# Patient Record
Sex: FEMALE | Race: WHITE | NOT HISPANIC OR LATINO | Employment: FULL TIME | URBAN - NONMETROPOLITAN AREA
[De-identification: names, ages, dates, MRNs, and addresses within clinical notes are randomized per-mention and may not be internally consistent; named-entity substitution may affect disease eponyms.]

---

## 2018-12-05 ENCOUNTER — TRANSCRIBE ORDERS (OUTPATIENT)
Dept: ADMINISTRATIVE | Facility: HOSPITAL | Age: 58
End: 2018-12-05

## 2018-12-05 DIAGNOSIS — Z12.39 SCREENING BREAST EXAMINATION: Primary | ICD-10-CM

## 2018-12-17 ENCOUNTER — HOSPITAL ENCOUNTER (OUTPATIENT)
Dept: MAMMOGRAPHY | Facility: HOSPITAL | Age: 58
Discharge: HOME | End: 2018-12-17
Attending: FAMILY MEDICINE | Admitting: FAMILY MEDICINE

## 2018-12-17 DIAGNOSIS — Z12.39 SCREENING BREAST EXAMINATION: ICD-10-CM

## 2018-12-17 PROCEDURE — 77067 SCR MAMMO BI INCL CAD: CPT

## 2018-12-17 PROCEDURE — 77063 BREAST TOMOSYNTHESIS BI: CPT

## 2019-09-11 ENCOUNTER — TRANSCRIBE ORDERS (OUTPATIENT)
Dept: ADMINISTRATIVE | Facility: HOSPITAL | Age: 59
End: 2019-09-11

## 2019-09-11 DIAGNOSIS — Z12.39 BREAST SCREENING: Primary | ICD-10-CM

## 2019-12-18 ENCOUNTER — HOSPITAL ENCOUNTER (OUTPATIENT)
Dept: MAMMOGRAPHY | Facility: HOSPITAL | Age: 59
Discharge: HOME | End: 2019-12-18
Admitting: FAMILY MEDICINE

## 2019-12-18 DIAGNOSIS — Z12.39 BREAST SCREENING: ICD-10-CM

## 2019-12-18 PROCEDURE — 77063 BREAST TOMOSYNTHESIS BI: CPT

## 2019-12-18 PROCEDURE — 77067 SCR MAMMO BI INCL CAD: CPT

## 2020-11-30 ENCOUNTER — LAB (OUTPATIENT)
Dept: LAB | Facility: HOSPITAL | Age: 60
End: 2020-11-30

## 2020-11-30 ENCOUNTER — HOSPITAL ENCOUNTER (OUTPATIENT)
Dept: ULTRASOUND IMAGING | Facility: HOSPITAL | Age: 60
Discharge: HOME | End: 2020-11-30

## 2020-11-30 ENCOUNTER — HOSPITAL ENCOUNTER (OUTPATIENT)
Dept: GENERAL RADIOLOGY | Facility: HOSPITAL | Age: 60
Discharge: HOME | End: 2020-11-30

## 2020-11-30 ENCOUNTER — TRANSCRIBE ORDERS (OUTPATIENT)
Dept: ADMINISTRATIVE | Facility: HOSPITAL | Age: 60
End: 2020-11-30

## 2020-11-30 DIAGNOSIS — R53.83 OTHER FATIGUE: ICD-10-CM

## 2020-11-30 DIAGNOSIS — R22.1 LOCALIZED SWELLING, MASS AND LUMP, NECK: ICD-10-CM

## 2020-11-30 DIAGNOSIS — R22.1 LOCALIZED SWELLING, MASS AND LUMP, NECK: Primary | ICD-10-CM

## 2020-11-30 LAB
ALBUMIN SERPL-MCNC: 5.1 G/DL (ref 3.5–5)
ALBUMIN/GLOB SERPL: 1.4 G/DL (ref 1.1–2.5)
ALP SERPL-CCNC: 67 U/L (ref 24–120)
ALT SERPL W P-5'-P-CCNC: 76 U/L (ref 0–35)
ANION GAP SERPL CALCULATED.3IONS-SCNC: 12 MMOL/L (ref 4–13)
AST SERPL-CCNC: 46 U/L (ref 7–45)
AUTO MIXED CELLS #: 0.7 10*3/MM3 (ref 0.1–2.6)
AUTO MIXED CELLS %: 10.7 % (ref 0.1–24)
BILIRUB SERPL-MCNC: 0.5 MG/DL (ref 0.1–1)
BILIRUB UR QL STRIP: NEGATIVE
BUN SERPL-MCNC: 15 MG/DL (ref 5–21)
BUN/CREAT SERPL: 18.5
CALCIUM SPEC-SCNC: 10.7 MG/DL (ref 8.4–10.4)
CHLORIDE SERPL-SCNC: 103 MMOL/L (ref 98–110)
CLARITY UR: CLEAR
CO2 SERPL-SCNC: 30 MMOL/L (ref 24–31)
COLOR UR: YELLOW
CREAT SERPL-MCNC: 0.81 MG/DL (ref 0.5–1.4)
ERYTHROCYTE [DISTWIDTH] IN BLOOD BY AUTOMATED COUNT: 12.3 % (ref 12.3–15.4)
GFR SERPL CREATININE-BSD FRML MDRD: 73 ML/MIN/1.73
GLOBULIN UR ELPH-MCNC: 3.6 GM/DL
GLUCOSE SERPL-MCNC: 101 MG/DL (ref 70–100)
GLUCOSE UR STRIP-MCNC: NEGATIVE MG/DL
HCT VFR BLD AUTO: 43.7 % (ref 34–46.6)
HGB BLD-MCNC: 14.8 G/DL (ref 12–15.9)
HGB UR QL STRIP.AUTO: NEGATIVE
KETONES UR QL STRIP: NEGATIVE
LEUKOCYTE ESTERASE UR QL STRIP.AUTO: NEGATIVE
LYMPHOCYTES # BLD AUTO: 2.4 10*3/MM3 (ref 0.7–3.1)
LYMPHOCYTES NFR BLD AUTO: 37 % (ref 19.6–45.3)
MCH RBC QN AUTO: 31.8 PG (ref 26.6–33)
MCHC RBC AUTO-ENTMCNC: 33.9 G/DL (ref 31.5–35.7)
MCV RBC AUTO: 93.8 FL (ref 79–97)
NEUTROPHILS NFR BLD AUTO: 3.5 10*3/MM3 (ref 1.7–7)
NEUTROPHILS NFR BLD AUTO: 52.3 % (ref 42.7–76)
NITRITE UR QL STRIP: NEGATIVE
PH UR STRIP.AUTO: 5.5 [PH] (ref 5–8)
PLATELET # BLD AUTO: 240 10*3/MM3 (ref 140–450)
PMV BLD AUTO: 8.9 FL (ref 6–12)
POTASSIUM SERPL-SCNC: 3.9 MMOL/L (ref 3.5–5.3)
PROT SERPL-MCNC: 8.7 G/DL (ref 6.3–8.7)
PROT UR QL STRIP: NEGATIVE
RBC # BLD AUTO: 4.66 10*6/MM3 (ref 3.77–5.28)
SODIUM SERPL-SCNC: 145 MMOL/L (ref 135–145)
SP GR UR STRIP: 1.02 (ref 1–1.03)
UROBILINOGEN UR QL STRIP: NORMAL
WBC # BLD AUTO: 6.6 10*3/MM3 (ref 3.4–10.8)

## 2020-11-30 PROCEDURE — 80053 COMPREHEN METABOLIC PANEL: CPT | Performed by: NURSE PRACTITIONER

## 2020-11-30 PROCEDURE — 76536 US EXAM OF HEAD AND NECK: CPT

## 2020-11-30 PROCEDURE — 81003 URINALYSIS AUTO W/O SCOPE: CPT | Performed by: NURSE PRACTITIONER

## 2020-11-30 PROCEDURE — 36415 COLL VENOUS BLD VENIPUNCTURE: CPT | Performed by: NURSE PRACTITIONER

## 2020-11-30 PROCEDURE — 84443 ASSAY THYROID STIM HORMONE: CPT

## 2020-11-30 PROCEDURE — 71046 X-RAY EXAM CHEST 2 VIEWS: CPT

## 2020-11-30 PROCEDURE — 85025 COMPLETE CBC W/AUTO DIFF WBC: CPT

## 2020-11-30 PROCEDURE — 82306 VITAMIN D 25 HYDROXY: CPT

## 2020-12-01 LAB
25(OH)D3 SERPL-MCNC: 29.1 NG/ML (ref 30–100)
TSH SERPL DL<=0.05 MIU/L-ACNC: 1.5 UIU/ML (ref 0.27–4.2)

## 2021-01-26 ENCOUNTER — OFFICE VISIT (OUTPATIENT)
Dept: PRIMARY CARE CLINIC | Age: 61
End: 2021-01-26
Payer: COMMERCIAL

## 2021-01-26 VITALS
SYSTOLIC BLOOD PRESSURE: 116 MMHG | TEMPERATURE: 96.5 F | DIASTOLIC BLOOD PRESSURE: 72 MMHG | OXYGEN SATURATION: 99 % | HEIGHT: 68 IN | HEART RATE: 92 BPM | WEIGHT: 157 LBS | BODY MASS INDEX: 23.79 KG/M2

## 2021-01-26 DIAGNOSIS — R00.0 RAPID HEART BEAT: Primary | ICD-10-CM

## 2021-01-26 DIAGNOSIS — R00.2 PALPITATIONS: ICD-10-CM

## 2021-01-26 PROCEDURE — 99204 OFFICE O/P NEW MOD 45 MIN: CPT | Performed by: PEDIATRICS

## 2021-01-26 PROCEDURE — 93000 ELECTROCARDIOGRAM COMPLETE: CPT | Performed by: PEDIATRICS

## 2021-01-26 RX ORDER — LEVOCETIRIZINE DIHYDROCHLORIDE 5 MG/1
1 TABLET, FILM COATED ORAL PRN
COMMUNITY
Start: 2021-01-20 | End: 2022-09-08 | Stop reason: SDUPTHER

## 2021-01-26 SDOH — ECONOMIC STABILITY: TRANSPORTATION INSECURITY
IN THE PAST 12 MONTHS, HAS THE LACK OF TRANSPORTATION KEPT YOU FROM MEDICAL APPOINTMENTS OR FROM GETTING MEDICATIONS?: NO

## 2021-01-26 ASSESSMENT — ENCOUNTER SYMPTOMS
EYE DISCHARGE: 0
NAUSEA: 0
TROUBLE SWALLOWING: 0
COUGH: 0
ALLERGIC/IMMUNOLOGIC NEGATIVE: 1
CONSTIPATION: 0
WHEEZING: 0
VOICE CHANGE: 0
DIARRHEA: 0
SINUS PRESSURE: 0
EYES NEGATIVE: 1
EYE REDNESS: 0
EYE PAIN: 0
PHOTOPHOBIA: 0
BLOOD IN STOOL: 0
ABDOMINAL PAIN: 0
EYE ITCHING: 0
RHINORRHEA: 0
SHORTNESS OF BREATH: 0
SORE THROAT: 0
ABDOMINAL DISTENTION: 0
VOMITING: 0

## 2021-01-26 ASSESSMENT — PATIENT HEALTH QUESTIONNAIRE - PHQ9
SUM OF ALL RESPONSES TO PHQ QUESTIONS 1-9: 2
SUM OF ALL RESPONSES TO PHQ QUESTIONS 1-9: 2
2. FEELING DOWN, DEPRESSED OR HOPELESS: 1
1. LITTLE INTEREST OR PLEASURE IN DOING THINGS: 1

## 2021-01-26 NOTE — PROGRESS NOTES
[address]19 [facility], [address]  Phone [phone]   Fax [phone]      OFFICE VISIT: 2021    Kourtney German-: 1960      HPI  Reason For Visit:  Red lAvarez is a 64 y.o. [facility], Tachycardia (heart racing since around [address] and goes. resting HR is around 105-110, When she climbs stairs or does activity shoots to 120s. This past week is has been stable in the 90s. ), and Health Maintenance (never had a colonoscopy, flu shot at [facility])    Patient presents to [facility]. She is concerned about her racing heart. She notes that this has been occurring relatively frequently over the past 1-2 months. Resting heart rate has been around 100-110. When she does any type of activity this increases significantly even into the 120s. This mostly occurs at rest.    Historical review did not show any presence of sympathomimetics or external cause of elevated heart rate. She has not had any lightheadedness dizziness or presyncope symptoms at all. When her heart rate does go fast, she notes that the rhythm remains regular. height is 5' 8\" (1.727 m) and weight is 157 lb (71.2 kg). Her temporal temperature is 96.5 °F (35.8 °C). Her blood pressure is 116/72 and her pulse is 92. Her oxygen saturation is 99%. Body mass index is 23.87 kg/m². I have reviewed the following with the Ms. German   Lab Review  No visits with results within 6 Month(s) from this visit. Latest known visit with results is:   No results found for any previous visit. Copies of these are in the chart. Current Outpatient Medications   Medication Sig Dispense Refill    levocetirizine (XYZAL) 5 MG tablet Take 1 tablet by mouth nightly       No current facility-administered medications for this visit. Allergies: Patient has no known allergies. History reviewed. No pertinent past medical history.     Family History   Problem Relation Age of Onset    Diabetes Mother     Diabetes Father     Heart Disease Father        Past Surgical History:   Procedure Laterality Date    CHOLECYSTECTOMY      HYSTERECTOMY, VAGINAL      TONSILLECTOMY         Social History     Tobacco Use    Smoking status: Never Smoker    Smokeless tobacco: Never Used   Substance Use Topics    Alcohol use: Not Currently        Review of Systems   Constitutional: Negative for appetite change (appetite normal), chills, fever and unexpected weight change. Weight is stable   HENT: Negative for congestion, dental problem, ear pain, hearing loss, postnasal drip, rhinorrhea, sinus pressure, sore throat, tinnitus, trouble swallowing and voice change. No headache, lightheadedness or dizziness   Eyes: Negative. Negative for photophobia, pain, discharge, redness, itching and visual disturbance. No change in vision. No blurred vision. No double vision   Respiratory: Negative for cough, shortness of breath (at rest or on exertion that is new or changes) and wheezing. No history of asthma or pneumonia. No orthopnea. No pain with deep excursions   Cardiovascular: Positive for palpitations (rapid HR with occasional hard beat. ). Negative for chest pain (or pressure) and leg swelling. Gastrointestinal: Negative for abdominal distention, abdominal pain, blood in stool (or melena), constipation, diarrhea, nausea and vomiting. No acid reflux   Endocrine: Negative. Negative for cold intolerance, heat intolerance, polydipsia and polyuria. Genitourinary: Negative. Negative for pelvic pain and urgency. No problems urinating. No problems with incontinence. Musculoskeletal: Negative for neck pain (no change in range of motion). Skin: Negative. Negative for rash. Allergic/Immunologic: Negative. Neurological: Negative for dizziness, tremors, syncope, weakness, light-headedness, numbness (or tingling sensation) and headaches.    Hematological: Negative for adenopathy. Does not bruise/bleed easily. Psychiatric/Behavioral: Negative for confusion (or difficulty with memory), dysphoric mood (to the point of interfering with her life functions) and sleep disturbance. The patient is not nervous/anxious (that is out of ordinary). Physical Exam  Vitals signs reviewed. Constitutional:       General: She is not in acute distress. Appearance: She is well-developed and normal weight. She is not toxic-appearing. Comments: Body habitus is    HENT:      Head: Normocephalic and atraumatic. Right Ear: Hearing, tympanic membrane, ear canal and external ear normal.      Left Ear: Hearing, tympanic membrane, ear canal and external ear normal.      Nose: Nose normal.      Mouth/Throat:      Mouth: Mucous membranes are moist.      Pharynx: Oropharynx is clear. Eyes:      General: Lids are normal.      Extraocular Movements: Extraocular movements intact. Conjunctiva/sclera: Conjunctivae normal.      Pupils: Pupils are equal, round, and reactive to light. Neck:      Musculoskeletal: Neck supple. Thyroid: No thyromegaly. Vascular: No carotid bruit or JVD. Trachea: Phonation normal.   Cardiovascular:      Rate and Rhythm: Normal rate and regular rhythm. No extrasystoles are present. Chest Wall: PMI is not displaced. Pulses: Normal pulses. Heart sounds: Normal heart sounds. No murmur. No friction rub. No gallop. Pulmonary:      Effort: Pulmonary effort is normal. No respiratory distress. Breath sounds: Normal breath sounds. No wheezing, rhonchi or rales. Abdominal:      General: Bowel sounds are normal. There is no distension. Palpations: Abdomen is soft. There is no mass. Tenderness: There is no abdominal tenderness. Genitourinary:     Comments: Examination deferred  Musculoskeletal: Normal range of motion. Comments: Joint examination reveals no acute arthritis or synovitis.      Lymphadenopathy: Cervical: No cervical adenopathy. Skin:     General: Skin is warm and dry. Capillary Refill: Capillary refill takes less than 2 seconds. Findings: No rash. Neurological:      General: No focal deficit present. Mental Status: She is alert and oriented to person, place, and time. Cranial Nerves: No cranial nerve deficit (by gross examination). Sensory: No sensory deficit. Motor: No tremor or atrophy. Gait: Gait normal.      Comments: Nio focal deficits appreciated   Psychiatric:         Mood and Affect: Mood normal.         Speech: Speech normal.         Behavior: Behavior normal. Behavior is cooperative. ECG interpretation:    ECG shows a sinus rhythm at 68 bpm.  Intervals and axes are normal.  There is a RR prime in V1 with a QRS is 80 ms. This is nondiagnostic. There are no ECG changes suggestive of ischemia. No evidence of prior myocardial infarction. Summary: Normal ECG       ASSESSMENT      ICD-10-CM    1. Rapid heart beat  R00.0 CBC Auto Differential     Comprehensive Metabolic Panel     Lipid Panel     Microalbumin / Creatinine Urine Ratio     T4, Free     TSH without Reflex     EKG 12 Lead     EKG 12 Lead     Longterm Continuous Cardiac Event Monitor   2. Palpitations  R00.2 CBC Auto Differential     Comprehensive Metabolic Panel     Lipid Panel     Microalbumin / Creatinine Urine Ratio     T4, Free     TSH without Reflex     Longterm Continuous Cardiac Event Monitor         PLAN    1. Rapid heart beat  Work-up for tachycardia and arrhythmia.  - CBC Auto Differential; Future  - Comprehensive Metabolic Panel; Future  - Lipid Panel; Future  - Microalbumin / Creatinine Urine Ratio; Future  - T4, Free; Future  - TSH without Reflex; Future  - EKG 12 Lead; Future  - EKG 12 Lead  - Longterm Continuous Cardiac Event Monitor; Future    2.  Palpitations  Work-up for tachycardia and arrhythmia  - CBC Auto Differential; Future  - Comprehensive Metabolic Panel; Future  - Lipid Panel; Future  - Microalbumin / Creatinine Urine Ratio; Future  - T4, Free; Future  - TSH without Reflex; Future  - Longterm Continuous Cardiac Event Monitor; Future      Orders Placed This Encounter   Procedures    CBC Auto Differential    Comprehensive Metabolic Panel    Lipid Panel    Microalbumin / Creatinine Urine Ratio    T4, Free    TSH without Reflex    Longterm Continuous Cardiac Event Monitor    EKG 12 Lead      Plan of action is to check labs for metabolic sign of elevated heart rate. This would include electrolyte abnormalities, acid-base irregularities, anemia, hormonal abnormalities such as thyroid. There is not appear to be any sympathomimetics involved. We will also look for any pathologic rhythms with the Zio patch. If laboratory studies are normal and there are no pathologic rhythms, this most likely is a benign process. We will then have the option of treating with a beta-blocker depending on how much this condition bothers her. If it is not overly bothersome, we do not have to treat with anything assuming that all of the above are normal.  This plan of action was described to, and understood by the patient in the office today    Return in about 2 months (around 3/26/2021) for 30. If all the above findings are normal, and she chooses not to start a beta-blocker, she may decide to cancel the appointment in 2 months.   Any action to do so will be considered that she has chosen not to start a beta-blocker and is comfortable with simply knowing that this is nonpathologic process    This was an in-house visit

## 2021-01-29 ENCOUNTER — HOSPITAL ENCOUNTER (OUTPATIENT)
Dept: NON INVASIVE DIAGNOSTICS | Age: 61
Discharge: HOME | End: 2021-01-29
Payer: COMMERCIAL

## 2021-01-29 DIAGNOSIS — R00.0 RAPID HEART BEAT: ICD-10-CM

## 2021-01-29 DIAGNOSIS — R00.2 PALPITATIONS: ICD-10-CM

## 2021-01-29 PROCEDURE — 93246 EXT ECG>7D<15D RECORDING: CPT

## 2021-02-05 DIAGNOSIS — R00.2 PALPITATIONS: ICD-10-CM

## 2021-02-05 DIAGNOSIS — R00.0 RAPID HEART BEAT: ICD-10-CM

## 2021-02-05 LAB
ALBUMIN SERPL-MCNC: 4.5 G/DL (ref 3.5–5.2)
ALP BLD-CCNC: 72 U/L (ref 35–104)
ALT SERPL-CCNC: 36 U/L (ref 5–33)
ANION GAP SERPL CALCULATED.3IONS-SCNC: 15 MMOL/L (ref 7–19)
AST SERPL-CCNC: 23 U/L (ref 5–32)
BASOPHILS ABSOLUTE: 0 K/UL (ref 0–0.2)
BASOPHILS RELATIVE PERCENT: 0.4 % (ref 0–1)
BILIRUB SERPL-MCNC: 0.3 MG/DL (ref 0.2–1.2)
BUN BLDV-MCNC: 15 MG/DL (ref 6–20)
CALCIUM SERPL-MCNC: 9.9 MG/DL (ref 8.6–10)
CHLORIDE BLD-SCNC: 109 MMOL/L (ref 98–111)
CHOLESTEROL, TOTAL: 213 MG/DL (ref 160–199)
CO2: 20 MMOL/L (ref 22–29)
CREAT SERPL-MCNC: 0.8 MG/DL (ref 0.5–0.9)
CREATININE URINE: 278.2 MG/DL (ref 4.2–622)
EOSINOPHILS ABSOLUTE: 0.1 K/UL (ref 0–0.6)
EOSINOPHILS RELATIVE PERCENT: 2.6 % (ref 0–5)
GFR AFRICAN AMERICAN: >59
GFR NON-AFRICAN AMERICAN: >60
GLUCOSE BLD-MCNC: 101 MG/DL (ref 74–109)
HCT VFR BLD CALC: 41.7 % (ref 37–47)
HDLC SERPL-MCNC: 74 MG/DL (ref 65–121)
HEMOGLOBIN: 13.2 G/DL (ref 12–16)
IMMATURE GRANULOCYTES #: 0 K/UL
LDL CHOLESTEROL CALCULATED: 124 MG/DL
LYMPHOCYTES ABSOLUTE: 1.7 K/UL (ref 1.1–4.5)
LYMPHOCYTES RELATIVE PERCENT: 37.3 % (ref 20–40)
MCH RBC QN AUTO: 31.8 PG (ref 27–31)
MCHC RBC AUTO-ENTMCNC: 31.7 G/DL (ref 33–37)
MCV RBC AUTO: 100.5 FL (ref 81–99)
MICROALBUMIN UR-MCNC: 1.3 MG/DL (ref 0–19)
MICROALBUMIN/CREAT UR-RTO: 4.7 MG/G
MONOCYTES ABSOLUTE: 0.5 K/UL (ref 0–0.9)
MONOCYTES RELATIVE PERCENT: 10.3 % (ref 0–10)
NEUTROPHILS ABSOLUTE: 2.3 K/UL (ref 1.5–7.5)
NEUTROPHILS RELATIVE PERCENT: 49.4 % (ref 50–65)
PDW BLD-RTO: 13 % (ref 11.5–14.5)
PLATELET # BLD: 251 K/UL (ref 130–400)
PMV BLD AUTO: 10.3 FL (ref 9.4–12.3)
POTASSIUM SERPL-SCNC: 4.4 MMOL/L (ref 3.5–5)
RBC # BLD: 4.15 M/UL (ref 4.2–5.4)
SODIUM BLD-SCNC: 144 MMOL/L (ref 136–145)
T4 FREE: 1.31 NG/DL (ref 0.93–1.7)
TOTAL PROTEIN: 6.9 G/DL (ref 6.6–8.7)
TRIGL SERPL-MCNC: 77 MG/DL (ref 0–149)
TSH SERPL DL<=0.05 MIU/L-ACNC: 2.07 UIU/ML (ref 0.27–4.2)
WBC # BLD: 4.7 K/UL (ref 4.8–10.8)

## 2021-03-26 ENCOUNTER — OFFICE VISIT (OUTPATIENT)
Dept: PRIMARY CARE CLINIC | Age: 61
End: 2021-03-26
Payer: COMMERCIAL

## 2021-03-26 VITALS
BODY MASS INDEX: 23.64 KG/M2 | SYSTOLIC BLOOD PRESSURE: 118 MMHG | DIASTOLIC BLOOD PRESSURE: 82 MMHG | OXYGEN SATURATION: 98 % | HEIGHT: 68 IN | WEIGHT: 156 LBS | HEART RATE: 84 BPM | TEMPERATURE: 97.3 F

## 2021-03-26 DIAGNOSIS — R00.2 PALPITATIONS: Primary | ICD-10-CM

## 2021-03-26 PROCEDURE — 99213 OFFICE O/P EST LOW 20 MIN: CPT | Performed by: PEDIATRICS

## 2021-03-26 ASSESSMENT — ENCOUNTER SYMPTOMS
SINUS PRESSURE: 0
COUGH: 0
NAUSEA: 0
VOICE CHANGE: 0
ABDOMINAL PAIN: 0
EYES NEGATIVE: 1
EYE PAIN: 0
RHINORRHEA: 0
SORE THROAT: 0
BLOOD IN STOOL: 0
CONSTIPATION: 0
TROUBLE SWALLOWING: 0
EYE ITCHING: 0
DIARRHEA: 0
PHOTOPHOBIA: 0
SHORTNESS OF BREATH: 0
EYE DISCHARGE: 0
WHEEZING: 0
ABDOMINAL DISTENTION: 0
VOMITING: 0
ALLERGIC/IMMUNOLOGIC NEGATIVE: 1
EYE REDNESS: 0

## 2021-03-26 NOTE — PROGRESS NOTES
1719 [facility], [address]  Phone [phone]   Fax [phone]      OFFICE VISIT: 3/26/2021    Kourtney German-: 1960      HPI  Reason For Visit:  Bebe Lund is a 63 y.o. Follow-up (since taking Zio off she has had maybe 2 spells that lasted a few moments, would like to discuss zio report)    Patient presents on follow-up from initial evaluation on 2021. She was having episodes of palpitations and rapid heartbeat. These were associated with activity at times. A Zio patch extended rhythm analysis was performed and did reveal episodes of supraventricular tachycardia that correspond with her symptoms. Laboratory evaluation was also performed which was negative (no identifiable etiology of rhythm aberration) as noted below    Her only medication is Xyzal 5 mg daily. Blood pressure is near optimal today. Heart rate is 84 today. Heart rate was 72 on last evaluation. Minimum heart rate on ZIO recording was 54 and maximum of 176. Predominant rhythm was sinus. No pathologic rhythms were noted on this evaluation. Mrs. Gael Montaño has not received any feedback in regards to this study as of yet. The symptoms that she experiences are not significant to the point of interfering with her life or lifestyle. We did discuss the symptoms and the rhythm abnormalities from a risk benefit ratio standpoint. She does not feel at this point in time that the risks are significant enough to merit a medication for her. Again, this is a benign rhythm. height is 5' 8\" (1.727 m) and weight is 156 lb (70.8 kg). Her temporal temperature is 97.3 °F (36.3 °C). Her blood pressure is 118/82 and her pulse is 84. Her oxygen saturation is 98%. Body mass index is 23.72 kg/m². I have reviewed the following with the Ms. German   Lab Review  Orders Only on 2021   Component Date Value    WBC 2021 4.7*    RBC 2021 4.15*    Hemoglobin 2021 13.2     Hematocrit 02/05/2021 41.7     MCV 02/05/2021 100.5*    MCH 02/05/2021 31.8*    MCHC 02/05/2021 31.7*    RDW 02/05/2021 13.0     Platelets 20/91/2313 251     MPV 02/05/2021 10.3     Neutrophils % 02/05/2021 49.4*    Lymphocytes % 02/05/2021 37.3     Monocytes % 02/05/2021 10.3*    Eosinophils % 02/05/2021 2.6     Basophils % 02/05/2021 0.4     Neutrophils Absolute 02/05/2021 2.3     Immature Granulocytes # 02/05/2021 0.0     Lymphocytes Absolute 02/05/2021 1.7     Monocytes Absolute 02/05/2021 0.50     Eosinophils Absolute 02/05/2021 0.10     Basophils Absolute 02/05/2021 0.00     Sodium 02/05/2021 144     Potassium 02/05/2021 4.4     Chloride 02/05/2021 109     CO2 02/05/2021 20*    Anion Gap 02/05/2021 15     Glucose 02/05/2021 101     BUN 02/05/2021 15     CREATININE 02/05/2021 0.8     GFR Non- 02/05/2021 >60     GFR  02/05/2021 >59     Calcium 02/05/2021 9.9     Total Protein 02/05/2021 6.9     Albumin 02/05/2021 4.5     Total Bilirubin 02/05/2021 0.3     Alkaline Phosphatase 02/05/2021 72     ALT 02/05/2021 36*    AST 02/05/2021 23     Cholesterol, Total 02/05/2021 213*    Triglycerides 02/05/2021 77     HDL 02/05/2021 74     LDL Calculated 02/05/2021 124     Microalbumin, Random Uri* 02/05/2021 1.30     Creatinine, Ur 02/05/2021 278.2     Microalbumin Creatinine * 02/05/2021 4.7     T4 Free 02/05/2021 1.31     TSH 02/05/2021 2.070      Copies of these are in the chart. Current Outpatient Medications   Medication Sig Dispense Refill    levocetirizine (XYZAL) 5 MG tablet Take 1 tablet by mouth nightly       No current facility-administered medications for this visit. Allergies: Patient has no known allergies. No past medical history on file.     Family History   Problem Relation Age of Onset    Diabetes Mother     Diabetes Father     Heart Disease Father        Past Surgical History:   Procedure Laterality Date    dysphoric mood (to the point of interfering with her life functions) and sleep disturbance. The patient is not nervous/anxious (that is out of ordinary). Physical Exam  Constitutional:       Appearance: She is normal weight. HENT:      Head: Normocephalic and atraumatic. Right Ear: External ear normal.      Left Ear: External ear normal.      Nose: Nose normal.      Mouth/Throat:      Mouth: Mucous membranes are moist.      Pharynx: Oropharynx is clear. Eyes:      Extraocular Movements: Extraocular movements intact. Pupils: Pupils are equal, round, and reactive to light. Neck:      Musculoskeletal: Normal range of motion. Skin:     General: Skin is warm and dry. Neurological:      General: No focal deficit present. Mental Status: She is alert. Psychiatric:         Mood and Affect: Mood normal.         Behavior: Behavior normal.             ASSESSMENT      ICD-10-CM    1. Palpitations  R00.2          PLAN    1. Palpitations  Reassurance that this is not a pathologic rhythm. The significance of this is not enough for her to want to take a medication on an ongoing basis. She will continue lifestyle modification by improving her physical endurance. She is also addressing stress issues. She is avoiding sympathomimetic medications and foods. If things change, she will let me know and we can always start a medication at that time. Medication purpose would be for symptom relief. We also discussed potential pathologic rhythms as well as their symptoms. Should she have any of these, she will let me know immediately because that would mandate us to consider a medication or other intervention. No orders of the defined types were placed in this encounter. Return if symptoms worsen or fail to improve. This was an in-house visit.

## 2021-11-12 ENCOUNTER — NURSE ONLY (OUTPATIENT)
Dept: PRIMARY CARE CLINIC | Age: 61
End: 2021-11-12
Payer: COMMERCIAL

## 2021-11-12 DIAGNOSIS — Z23 NEED FOR COVID-19 VACCINE: Primary | ICD-10-CM

## 2021-11-12 PROCEDURE — 91300 COVID-19, PFIZER VACCINE 30MCG/0.3ML DOSE: CPT | Performed by: PEDIATRICS

## 2021-11-12 PROCEDURE — 0004A COVID-19, PFIZER VACCINE 30MCG/0.3ML DOSE: CPT | Performed by: PEDIATRICS

## 2021-11-12 NOTE — PROGRESS NOTES
After obtaining consent, and per orders of Dr. Susana Bills, injection of Pfizer Covid Booster 0.3 mL was given in the Left deltoid . Patient tolerated it well. Patient instructed to report any adverse reaction to me immediately. COVID-19, Pfizer, PF, 30mcg/0.3mL      Current Status      Updated on: 11/12/2021  9:07 AM    Name Date Status Dose VIS Date Route Site  Lot# Given By Verified By   COVID-19Piero PF, 30mcg/0.3mL 11/12/2021 Given 0.3 mL 8/23/2021 IM left delt [employer] [id number] Lenin Malcolm --   Exp. Date [facility] # Product Time Location External Comment   11/30/2021 [id number] [employer] COVID-19 Vacc -- -- -- --   Question Answer Comment   VIS/EUA reviewed with patient and questions answered? Yes    VIS/EUA Given Date 11/12/2021    COVID-19 Vaccine Dose Booster    Pandemic Funds used for this vaccine?  Yes    Target Population Age 12+    Updated by: Lenin Malcolm

## 2021-11-17 ENCOUNTER — TRANSCRIBE ORDERS (OUTPATIENT)
Dept: ADMINISTRATIVE | Facility: HOSPITAL | Age: 61
End: 2021-11-17

## 2021-11-17 DIAGNOSIS — Z12.31 SCREENING MAMMOGRAM, ENCOUNTER FOR: Primary | ICD-10-CM

## 2022-01-05 ENCOUNTER — HOSPITAL ENCOUNTER (OUTPATIENT)
Dept: MAMMOGRAPHY | Facility: HOSPITAL | Age: 62
Discharge: HOME | End: 2022-01-05
Admitting: PEDIATRICS

## 2022-01-05 LAB — MAMMOGRAPHY, EXTERNAL: NORMAL

## 2022-01-05 PROCEDURE — 77067 SCR MAMMO BI INCL CAD: CPT

## 2022-01-05 PROCEDURE — 77063 BREAST TOMOSYNTHESIS BI: CPT

## 2022-06-24 ENCOUNTER — OFFICE VISIT (OUTPATIENT)
Dept: PRIMARY CARE CLINIC | Age: 62
End: 2022-06-24
Payer: COMMERCIAL

## 2022-06-24 VITALS
BODY MASS INDEX: 22.58 KG/M2 | HEART RATE: 98 BPM | WEIGHT: 149 LBS | TEMPERATURE: 98.4 F | HEIGHT: 68 IN | OXYGEN SATURATION: 100 % | DIASTOLIC BLOOD PRESSURE: 80 MMHG | SYSTOLIC BLOOD PRESSURE: 126 MMHG

## 2022-06-24 DIAGNOSIS — K64.8 INTERNAL HEMORRHOIDS: ICD-10-CM

## 2022-06-24 DIAGNOSIS — Z12.11 COLON CANCER SCREENING: ICD-10-CM

## 2022-06-24 DIAGNOSIS — Z91.09 ENVIRONMENTAL ALLERGIES: Primary | ICD-10-CM

## 2022-06-24 PROCEDURE — 99214 OFFICE O/P EST MOD 30 MIN: CPT | Performed by: PEDIATRICS

## 2022-06-24 RX ORDER — LEVOCETIRIZINE DIHYDROCHLORIDE 5 MG/1
5 TABLET, FILM COATED ORAL NIGHTLY
Qty: 90 TABLET | Refills: 3 | Status: SHIPPED | OUTPATIENT
Start: 2022-06-24

## 2022-06-24 ASSESSMENT — ENCOUNTER SYMPTOMS
RHINORRHEA: 1
EYES NEGATIVE: 1
RESPIRATORY NEGATIVE: 1

## 2022-06-24 NOTE — PROGRESS NOTES
[address]19 [facility], [address]  Phone [phone]   Fax [phone]      OFFICE VISIT: 2022    Kourtney German-: 1960      [facility]  Reason For Visit:  Markie Musa is a 63 y.o. Hemorrhoids (BRBPR ), Health Maintenance (has never had a colonoscopy. needs one ordered. ), and Discuss Medications (wants a rx for xyzal)    Patient presents as of bleeding hemorrhoids. This is been going on for about 3 months. She has utilized preparation H and sitz baths. She believes these are internal hemorrhoids and     She also has difficulty with some allergies. She would like a prescription for Xyzal sent in.     height is 5' 8\" (1.727 m) and weight is 149 lb (67.6 kg). Her temporal temperature is 98.4 °F (36.9 °C). Her blood pressure is 126/80 and her pulse is 98. Her oxygen saturation is 100%. Body mass index is 22.66 kg/m². I have reviewed the following with the Ms. German   Lab Review  No visits with results within 6 Month(s) from this visit.    Latest known visit with results is:   Orders Only on 2021   Component Date Value    WBC 2021 4.7*    RBC 2021 4.15*    Hemoglobin 2021 13.2     Hematocrit 2021 41.7     MCV 2021 100.5*    MCH 2021 31.8*    MCHC 2021 31.7*    RDW 2021 13.0     Platelets  251     MPV 2021 10.3     Neutrophils % 2021 49.4*    Lymphocytes % 2021 37.3     Monocytes % 2021 10.3*    Eosinophils % 2021 2.6     Basophils % 2021 0.4     Neutrophils Absolute 2021 2.3     Immature Granulocytes # 2021 0.0     Lymphocytes Absolute 2021 1.7     Monocytes Absolute 2021 0.50     Eosinophils Absolute 2021 0.10     Basophils Absolute 2021 0.00     Sodium 2021 144     Potassium 2021 4.4     Chloride 2021 109     CO2 2021 20*    Anion Gap 2021 15     Glucose 2021 101     BUN 02/05/2021 15     CREATININE 02/05/2021 0.8     GFR Non- 02/05/2021 >60     GFR  02/05/2021 >59     Calcium 02/05/2021 9.9     Total Protein 02/05/2021 6.9     Albumin 02/05/2021 4.5     Total Bilirubin 02/05/2021 0.3     Alkaline Phosphatase 02/05/2021 72     ALT 02/05/2021 36*    AST 02/05/2021 23     Cholesterol, Total 02/05/2021 213*    Triglycerides 02/05/2021 77     HDL 02/05/2021 74     LDL Calculated 02/05/2021 124     Microalbumin, Random Uri* 02/05/2021 1.30     Creatinine, Ur 02/05/2021 278.2     Microalbumin Creatinine * 02/05/2021 4.7     T4 Free 02/05/2021 1.31     TSH 02/05/2021 2.070      Copies of these are in the chart. Current Outpatient Medications   Medication Sig Dispense Refill    levocetirizine (XYZAL) 5 MG tablet Take 1 tablet by mouth nightly 90 tablet 3    levocetirizine (XYZAL) 5 MG tablet Take 1 tablet by mouth nightly       No current facility-administered medications for this visit. Allergies: Patient has no known allergies. No past medical history on file. Family History   Problem Relation Age of Onset    Diabetes Mother     Diabetes Father     Heart Disease Father        Past Surgical History:   Procedure Laterality Date    CHOLECYSTECTOMY      HYSTERECTOMY, VAGINAL      TONSILLECTOMY         Social History     Tobacco Use    Smoking status: Never Smoker    Smokeless tobacco: Never Used   Substance Use Topics    Alcohol use: Not Currently        Review of Systems   Constitutional: Negative. HENT: Positive for congestion, postnasal drip and rhinorrhea. Eyes: Negative. Respiratory: Negative. Cardiovascular: Negative. Gastrointestinal:        Internal hemorrhoids  Due for colon cancer screening. Endocrine: Negative. Genitourinary: Negative. Musculoskeletal: Negative. Skin: Negative. Allergic/Immunologic: Positive for environmental allergies. Neurological: Negative. Hematological: Negative. Psychiatric/Behavioral: Negative. Physical Exam  Constitutional:       Appearance: Normal appearance. HENT:      Head: Normocephalic and atraumatic. Right Ear: Tympanic membrane, ear canal and external ear normal.      Left Ear: Tympanic membrane, ear canal and external ear normal.      Nose: Nose normal.      Mouth/Throat:      Mouth: Mucous membranes are moist.      Pharynx: Oropharynx is clear. Posterior oropharyngeal erythema (mild posterior) present. Eyes:      Extraocular Movements: Extraocular movements intact. Pupils: Pupils are equal, round, and reactive to light. Cardiovascular:      Rate and Rhythm: Normal rate and regular rhythm. Pulses: Normal pulses. Heart sounds: Normal heart sounds. No murmur heard. No friction rub. No gallop. Pulmonary:      Effort: Pulmonary effort is normal.      Breath sounds: Normal breath sounds. No stridor. No wheezing or rales. Abdominal:      General: Abdomen is flat. There is no distension. Palpations: Abdomen is soft. Tenderness: There is no abdominal tenderness. Genitourinary:     Comments: Exam deferred  Musculoskeletal:         General: Normal range of motion. Cervical back: Normal range of motion and neck supple. Right lower leg: No edema. Left lower leg: No edema. Skin:     General: Skin is warm and dry. Neurological:      General: No focal deficit present. Mental Status: She is alert and oriented to person, place, and time. Psychiatric:         Mood and Affect: Mood normal.         Behavior: Behavior normal.             ASSESSMENT      ICD-10-CM    1. Environmental allergies  Z91.09 levocetirizine (XYZAL) 5 MG tablet   2. Colon cancer screening  Z12.11 Christa Machaod MD, Gastroenterology, [place]   3. Internal hemorrhoids  K64.8 Christa Machado MD, Gastroenterology, [facility]    1.  Environmental allergies  I will send in prescription for Xyzal at this time  - levocetirizine (XYZAL) 5 MG tablet; Take 1 tablet by mouth nightly  Dispense: 90 tablet; Refill: 3    2. Colon cancer screening  We are going to set her up for colon cancer screening. With bright red blood per rectum she would not qualify for true screening and this may be diagnostic, given that we have a likely source for her bleeding. She does have a family member (not first-degree relative) with UC, and another with Crohn's. - Telma Narvaez MD, Gastroenterology, [address] [place] [address]    3. Internal hemorrhoids  We will set up with gastroenterology to see if they can band these  - Telma Narvaez MD, Gastroenterology, [place]      Orders Placed This Encounter   Procedures   Telma Narvaez MD, Gastroenterology, [address] [place] [address]        Return if symptoms worsen or fail to improve.        This was an in-house visit

## 2022-09-08 ENCOUNTER — OFFICE VISIT (OUTPATIENT)
Dept: GASTROENTEROLOGY | Age: 62
End: 2022-09-08
Payer: COMMERCIAL

## 2022-09-08 VITALS
WEIGHT: 155.8 LBS | OXYGEN SATURATION: 98 % | BODY MASS INDEX: 23.61 KG/M2 | HEART RATE: 68 BPM | DIASTOLIC BLOOD PRESSURE: 68 MMHG | SYSTOLIC BLOOD PRESSURE: 116 MMHG | HEIGHT: 68 IN

## 2022-09-08 DIAGNOSIS — K62.5 RECTAL BLEEDING: Primary | ICD-10-CM

## 2022-09-08 DIAGNOSIS — R10.84 GENERALIZED ABDOMINAL PAIN: ICD-10-CM

## 2022-09-08 DIAGNOSIS — R19.7 INTERMITTENT DIARRHEA: ICD-10-CM

## 2022-09-08 PROCEDURE — 99204 OFFICE O/P NEW MOD 45 MIN: CPT | Performed by: NURSE PRACTITIONER

## 2022-09-08 ASSESSMENT — ENCOUNTER SYMPTOMS
TROUBLE SWALLOWING: 0
DIARRHEA: 1
SHORTNESS OF BREATH: 0
NAUSEA: 0
COUGH: 0
VOMITING: 0
CONSTIPATION: 0
RECTAL PAIN: 0
BLOOD IN STOOL: 0
ABDOMINAL PAIN: 1
CHOKING: 0
ANAL BLEEDING: 1
ABDOMINAL DISTENTION: 0

## 2022-09-08 NOTE — PATIENT INSTRUCTIONS
Schedule colonoscopy. No aspirin, ibuprofen, naproxen, fish oil or vitamin E for 5 days before procedure. Do not eat or drink after midnight the day of the procedure. Allowed medications can be taken with a small sip of water. Please review your prep instructions for allowed medications. You will not be able to drive for 24 hours after the procedure due to sedation. You must have a responsible adult, 25 year or older, to accompany you and drive you home the day of your procedure. If you are on blood thinners, clearance from the prescribing physician will be obtained before your procedure is scheduled. If it is determined it is not safe to hold these medications for a short time an alternative procedure for evaluation may be recommended. Risks of colonoscopy include, but are not limited to, perforation, bleeding, and infection, Risk of perforation and bleeding are increased if there is a polyp removed. Anesthesia risks will be reviewed with you before the procedure by a member of the anesthesia department. Your physician may also schedule a follow up appointment with the nurse practitioner to discuss pathology, symptoms or to check if you have had any problems related to your procedure. If you prefer not to return to the office after your procedure please discuss this with your physician on the day of your colonoscopy. The physician will talk with you and/or your family after the procedure is completed. Final recommendations are based on the pathologist report if biopsies or specimens are taken. If polyps are removed during the procedure they will be sent to a pathologist for analysis. Unless you have a follow up appointment scheduled, you will be notified by mail of the pathology results within 4 weeks. If you have not received results after 4 weeks you may call the office to obtain this information. For Colonoscopy:   You will be given specific directions regarding restrictions to diet and bowel prep instructions including laxatives. Please read these instructions one week prior to your scheduled procedure to ensure that you are prepared. If you have any questions regarding these instructions please call our office Mon through Fri from 8:00 am to 4:00 pm.     Follow prep instructions provided for bowel prep. Take all of the bowel prep as directed. If you are having problems with nausea, stop your prep for 30-45 min to allow the nausea to subside before resuming your prep. It is important to drink plenty of fluids throughout the day before taking your laxatives. This will help to protect your kidneys, prevent dehydration and maximize the effect of the bowel prep. Your diet before a colonoscopy bowel preparation is very important to ensure a successful colon exam. It is recommended to consider certain changes to your diet three to four days prior to the procedure. Remember that your bowels need to be completely empty for the exam.    What foods are good to eat? Cut down on heavy solid foods three to four days before the procedure and start introducing lighter meals to your diet. The following food suggestions are a good part of your diet before a colonoscopy bowel preparation. Light meat that is easily digestible such as chicken (without the skin)   Potatoes without skin   Cheese   Eggs   A light meal of steamed white fish   Light clear soups    Foods and drinks to avoid  Avoid foods that contain too much fiber. Stay clear of dark colored beverages. They can stick to the walls of the digestive tract and make it difficult to differentiate from blood.  Some of these foods are:  Red meat, rice, nuts and vegetables   Milk, other milk based fluids and cream   Most fruit and puddings   Whole grain pasta   Cereals, bran and seeds   Colored beverages, especially those that are red or purple in color   Red colored Jell-O     On the day before the colonoscopy, continue to drink

## 2022-09-08 NOTE — PROGRESS NOTES
Subjective:     Patient ID: Micky Gallego is a 63 y.o. female  PCP: Cesar Del Rio DO  Referring Provider: Meryl Newsome DO    HPI  Patient presents to the office today with the following complaints: New Patient      Pt seen today for colonoscopy. She admits to BRBPR. She c/o internal hemorrhoids. She reports diarrhea stools 2-3 times a week. She will have bleeding with this. She will have to wear panty liners. Blood in bright red in color. \"I am not convinced bleeding is from the outside. \"  Denies any rectal pain or burning. She will have abdominal pain on days she has diarrhea. She denies any nocturnal diarrhea, usually a morning thing. She has never had EGD or Colonoscopy   Denies any family hx colon cancer or colon polyps    Assessment:     1. Rectal bleeding    2. Intermittent diarrhea    3. Generalized abdominal pain            Plan:   - Schedule colonoscopy  Instruct on bowel prep. Nothing to eat or drink after midnight the day of the exam.  Unable to drive for 24 hours after the procedure. No aspirin or nonsteroidal anti-inflammatories for 5 days before procedure. I have discussed the benefits, alternatives, and risks (including bleeding, perforation and death)  for pursuing Endoscopy (EGD/Colonscopy/EUS/ERCP) with the patient and they are willing to continue. We also discussed the need for anesthesia, IV access, proper dietary changes, medication changes if necessary, and need for bowel prep (if ordered) prior to their Endoscopic procedure. They are aware they must have someone accompany them to their scheduled procedure to drive them home - they agree to the above and are willing to continue. Orders  No orders of the defined types were placed in this encounter. Medications  No orders of the defined types were placed in this encounter. Patient History:     No past medical history on file.     Past Surgical History:   Procedure Laterality Date CHOLECYSTECTOMY      HYSTERECTOMY, VAGINAL      TONSILLECTOMY         Family History   Problem Relation Age of Onset    Diabetes Mother     Diabetes Father     Heart Disease Father     Colon Cancer Neg Hx     Hemochromatosis Neg Hx     Liver Cancer Neg Hx     Stomach Cancer Neg Hx        Social History     Socioeconomic History    Marital status:    Tobacco Use    Smoking status: Never    Smokeless tobacco: Never   Vaping Use    Vaping Use: Never used   Substance and Sexual Activity    Alcohol use: Not Currently    Drug use: Never    Sexual activity: Yes     Partners: Male       Current Outpatient Medications   Medication Sig Dispense Refill    levocetirizine (XYZAL) 5 MG tablet Take 1 tablet by mouth nightly 90 tablet 3     No current facility-administered medications for this visit. No Known Allergies    Review of Systems   Constitutional:  Negative for activity change, appetite change, fatigue, fever and unexpected weight change. HENT:  Negative for trouble swallowing. Respiratory:  Negative for cough, choking and shortness of breath. Cardiovascular:  Negative for chest pain. Gastrointestinal:  Positive for abdominal pain, anal bleeding and diarrhea. Negative for abdominal distention, blood in stool, constipation, nausea, rectal pain and vomiting. Allergic/Immunologic: Negative for food allergies. All other systems reviewed and are negative. Objective:     /68   Pulse 68   Ht 5' 8\" (1.727 m)   Wt 155 lb 12.8 oz (70.7 kg)   SpO2 98%   BMI 23.69 kg/m²     Physical Exam  Vitals reviewed. Constitutional:       General: She is not in acute distress. Appearance: She is well-developed. HENT:      Head: Normocephalic and atraumatic.       Right Ear: External ear normal.      Left Ear: External ear normal.      Nose: Nose normal.      Comments: Mask on     Mouth/Throat:      Comments: Mask on  Eyes:      Conjunctiva/sclera: Conjunctivae normal.      Pupils: Pupils are equal, round, and reactive to light. Cardiovascular:      Rate and Rhythm: Normal rate and regular rhythm. Heart sounds: Normal heart sounds. No murmur heard. No friction rub. No gallop. Pulmonary:      Effort: Pulmonary effort is normal. No respiratory distress. Breath sounds: Normal breath sounds. Abdominal:      General: Bowel sounds are normal. There is no distension. Palpations: Abdomen is soft. There is no mass. Tenderness: There is no abdominal tenderness. There is no guarding or rebound. Musculoskeletal:         General: Normal range of motion. Cervical back: Normal range of motion and neck supple. Skin:     General: Skin is warm and dry. Findings: No rash. Nails: There is no clubbing. Neurological:      Mental Status: She is alert and oriented to person, place, and time. Gait: Gait normal.   Psychiatric:         Behavior: Behavior normal.         Thought Content:  Thought content normal.

## 2022-09-12 ENCOUNTER — ANESTHESIA EVENT (OUTPATIENT)
Dept: OPERATING ROOM | Age: 62
End: 2022-09-12

## 2022-09-15 ENCOUNTER — APPOINTMENT (OUTPATIENT)
Dept: OPERATING ROOM | Age: 62
End: 2022-09-15

## 2022-09-15 ENCOUNTER — ANESTHESIA (OUTPATIENT)
Dept: OPERATING ROOM | Age: 62
End: 2022-09-15

## 2022-09-15 ENCOUNTER — HOSPITAL ENCOUNTER (OUTPATIENT)
Age: 62
Setting detail: SPECIMEN
Discharge: HOME | End: 2022-09-15
Payer: COMMERCIAL

## 2022-09-15 ENCOUNTER — HOSPITAL ENCOUNTER (OUTPATIENT)
Age: 62
Setting detail: OUTPATIENT SURGERY
Discharge: HOME | End: 2022-09-15
Attending: INTERNAL MEDICINE | Admitting: INTERNAL MEDICINE
Payer: COMMERCIAL

## 2022-09-15 VITALS
DIASTOLIC BLOOD PRESSURE: 83 MMHG | SYSTOLIC BLOOD PRESSURE: 128 MMHG | BODY MASS INDEX: 22.88 KG/M2 | HEART RATE: 78 BPM | WEIGHT: 151 LBS | OXYGEN SATURATION: 100 % | TEMPERATURE: 99 F | HEIGHT: 68 IN | RESPIRATION RATE: 16 BRPM

## 2022-09-15 PROCEDURE — G8918 PT W/O PREOP ORDER IV AB PRO: HCPCS

## 2022-09-15 PROCEDURE — 88305 TISSUE EXAM BY PATHOLOGIST: CPT

## 2022-09-15 PROCEDURE — 45380 COLONOSCOPY AND BIOPSY: CPT | Performed by: INTERNAL MEDICINE

## 2022-09-15 PROCEDURE — G8907 PT DOC NO EVENTS ON DISCHARG: HCPCS

## 2022-09-15 PROCEDURE — 45380 COLONOSCOPY AND BIOPSY: CPT

## 2022-09-15 RX ORDER — PROPOFOL 10 MG/ML
INJECTION, EMULSION INTRAVENOUS PRN
Status: DISCONTINUED | OUTPATIENT
Start: 2022-09-15 | End: 2022-09-15 | Stop reason: SDUPTHER

## 2022-09-15 RX ORDER — HYDROCORTISONE ACETATE 25 MG/1
25 SUPPOSITORY RECTAL EVERY 12 HOURS
Qty: 28 SUPPOSITORY | Refills: 0 | Status: SHIPPED | OUTPATIENT
Start: 2022-09-15

## 2022-09-15 RX ORDER — SODIUM CHLORIDE 9 MG/ML
INJECTION, SOLUTION INTRAVENOUS CONTINUOUS
Status: DISCONTINUED | OUTPATIENT
Start: 2022-09-15 | End: 2022-09-15 | Stop reason: HOSPADM

## 2022-09-15 RX ORDER — LIDOCAINE HYDROCHLORIDE 10 MG/ML
INJECTION, SOLUTION INFILTRATION; PERINEURAL PRN
Status: DISCONTINUED | OUTPATIENT
Start: 2022-09-15 | End: 2022-09-15 | Stop reason: SDUPTHER

## 2022-09-15 RX ADMIN — PROPOFOL 200 MG: 10 INJECTION, EMULSION INTRAVENOUS at 08:35

## 2022-09-15 RX ADMIN — SODIUM CHLORIDE: 9 INJECTION, SOLUTION INTRAVENOUS at 07:53

## 2022-09-15 RX ADMIN — LIDOCAINE HYDROCHLORIDE 30 MG: 10 INJECTION, SOLUTION INFILTRATION; PERINEURAL at 08:35

## 2022-09-15 NOTE — H&P
tobacco: Never   Vaping Use    Vaping Use: Never used   Substance Use Topics    Alcohol use: Yes     Comment: 1 drink daily    Drug use: Never       Vital Signs: There were no vitals filed for this visit. Physical Exam:  Cardiac:  [x]WNL  []Comments:  Pulmonary:  [x]WNL   []Comments:  Neuro/Mental Status:  [x]WNL  []Comments:  Abdominal:  [x]WNL    []Comments:  Other:   []WNL  []Comments:    Informed Consent:  The risks and benefits of the procedure have been discussed with either the patient or if they cannot consent, their representative. Assessment:  Patient examined and appropriate for planned sedation and procedure. Plan:  Proceed with planned sedation and procedure as above. Aure Moe am scribing for and in the presence of Dr. Julius Duque MD.  Electronically signed by Hamida Dawson RN on 9/15/2022 at 7:42 AM    I personally performed the services described in this documentation as scribed by Marisol Guerrero, and it appears accurate and complete.      Julius Duque MD  9/15/2022

## 2022-09-15 NOTE — ANESTHESIA PRE PROCEDURE
Department of Anesthesiology  Preprocedure Note       Name:  Kris Kitchen   Age:  63 y.o.  :  1960                                          MRN:  [id number]         Date:  9/15/2022      Surgeon: Pablo Patiño):  Loida Devine MD    Procedure: Procedure(s):  COLONOSCOPY DIAGNOSTIC    Medications prior to admission:   Prior to Admission medications    Medication Sig Start Date End Date Taking? Authorizing Provider   levocetirizine (XYZAL) 5 MG tablet Take 1 tablet by mouth nightly 22   B  DO Frandy       Current medications:    No current facility-administered medications for this encounter. Allergies:  No Known Allergies    Problem List:  There is no problem list on file for this patient. Past Medical History:  History reviewed. No pertinent past medical history. Past Surgical History:        Procedure Laterality Date    APPENDECTOMY      CHOLECYSTECTOMY      HYSTERECTOMY, VAGINAL      TONSILLECTOMY         Social History:    Social History     Tobacco Use    Smoking status: Never    Smokeless tobacco: Never   Substance Use Topics    Alcohol use: Yes     Comment: 1 drink daily                                Counseling given: Not Answered      Vital Signs (Current): There were no vitals filed for this visit.                                            BP Readings from Last 3 Encounters:   22 116/68   22 126/80   21 118/82       NPO Status: Time of last liquid consumption: 0400 (prep)                        Time of last solid consumption: 1100                        Date of last liquid consumption: 09/15/22                        Date of last solid food consumption: 22    BMI:   Wt Readings from Last 3 Encounters:   22 155 lb 12.8 oz (70.7 kg)   22 149 lb (67.6 kg)   21 156 lb (70.8 kg)     There is no height or weight on file to calculate BMI.    CBC:   Lab Results   Component Value Date/Time    WBC 4.7 2021 07:40 AM    RBC 4.15 02/05/2021 07:40 AM    HGB 13.2 02/05/2021 07:40 AM    HCT 41.7 02/05/2021 07:40 AM    .5 02/05/2021 07:40 AM    RDW 13.0 02/05/2021 07:40 AM     02/05/2021 07:40 AM       CMP:   Lab Results   Component Value Date/Time     02/05/2021 07:40 AM    K 4.4 02/05/2021 07:40 AM     02/05/2021 07:40 AM    CO2 20 02/05/2021 07:40 AM    BUN 15 02/05/2021 07:40 AM    CREATININE 0.8 02/05/2021 07:40 AM    GFRAA >59 02/05/2021 07:40 AM    LABGLOM >60 02/05/2021 07:40 AM    GLUCOSE 101 02/05/2021 07:40 AM    PROT 6.9 02/05/2021 07:40 AM    CALCIUM 9.9 02/05/2021 07:40 AM    BILITOT 0.3 02/05/2021 07:40 AM    ALKPHOS 72 02/05/2021 07:40 AM    AST 23 02/05/2021 07:40 AM    ALT 36 02/05/2021 07:40 AM       POC Tests: No results for input(s): POCGLU, POCNA, POCK, POCCL, POCBUN, POCHEMO, POCHCT in the last 72 hours. Coags: No results found for: PROTIME, INR, APTT    HCG (If Applicable): No results found for: PREGTESTUR, PREGSERUM, HCG, HCGQUANT     ABGs: No results found for: PHART, PO2ART, TZD7RYZ, RNS9CVB, BEART, Y6XABBBV     Type & Screen (If Applicable):  No results found for: LABABO, LABRH    Drug/Infectious Status (If Applicable):  No results found for: HIV, HEPCAB    COVID-19 Screening (If Applicable): No results found for: COVID19        Anesthesia Evaluation  Patient summary reviewed and Nursing notes reviewed  Airway: Mallampati: II  TM distance: >3 FB   Neck ROM: full  Mouth opening: > = 3 FB   Dental: normal exam         Pulmonary:Negative Pulmonary ROS and normal exam                               Cardiovascular:Negative CV ROS  Exercise tolerance: good (>4 METS),                     Neuro/Psych:   Negative Neuro/Psych ROS              GI/Hepatic/Renal: Neg GI/Hepatic/Renal ROS            Endo/Other: Negative Endo/Other ROS                    Abdominal:             Vascular: negative vascular ROS.          Other Findings:           Anesthesia Plan      general and TIVA     ASA 2 Induction: intravenous. Anesthetic plan and risks discussed with patient and spouse. Plan discussed with CRNA.                     Pollo Lance, APRN - CRNA   9/15/2022

## 2022-09-15 NOTE — DISCHARGE INSTRUCTIONS
Recommendations:  1. Repeat colonoscopy: pending pathology - 10 years, for CRC screening  2. Await biopsy results  3. Anusol suppositories twice a day x 14 days. POST-OP ORDERS: ENDOSCOPY & COLONOSCOPY:    1. Rest today. 2. DO NOT eat or drink until wide awake; eat your usual diet today in moderate amount only. 3. DO NOT drive today. 4. Call physician if you have severe pain, vomiting, fever, rectal bleeding or black bowel movements. 5.  If a biopsy was taken or a polyp removed, you should expect to hear results in about 21 days. If you have heard nothing from your physician by then, call the office for results. 6.  Discharge home when patient awake, vitals signs stable and tolerating liquids.

## 2022-09-15 NOTE — OP NOTE
Patient: Ivette House : 1960  Med Rec#: [id number] Acc#: [id number]   Primary Care Provider LARS Carmen DO    Date of Procedure:  9/15/2022    Endoscopist: Shikha Ruiz MD    Referring Provider: Jemima Weathers DO, ELLEN Field    Operation Performed: Colonoscopy with biopsy    Indications: rectal bleeding, abdominal pain    Anesthesia:  Sedation was administered by anesthesia who monitored the patient during the procedure. I met with Kourtney German prior to procedure. We discussed the procedure itself, and I have discussed the risks of endoscopy (including-- but not limited to-- pain, discomfort, bleeding potentially requiring second endoscopic procedure and/or blood transfusion, organ perforation requiring operative repair, damage to organs near the colon, infection, aspiration, cardiopulmonary/allergic reaction), benefits, indications to endoscopy. Additionally, we discussed options other than colonoscopy. The patient expressed understanding. All questions answered. The patient decided to proceed with the procedure. Signed informed consent was placed on the chart. Blood Loss: minimal    Withdrawal time: n/a  Bowel Prep: adequate     Complications: no immediate complications    DESCRIPTION OF PROCEDURE:     A time out was performed. After written informed consent was obtained, the patient was placed in the left lateral position. The perianal area was inspected, and a digital rectal exam was performed. A rectal exam was performed: normal tone, no palpable lesions. At this point, a forward viewing Olympus colonoscope was inserted into the anus and carefully advanced to the cecum. The cecum was identified by the ileocecal valve and the appendiceal orifice. The colonoscope was then slowly withdrawn with careful inspection of the mucosa in a linear and circumferential fashion. The scope was retroflexed in the rectum.  Suction was utilized during the procedure to remove as much air as possible from the bowel. The colonoscope was removed from the patient, and the procedure was terminated. Findings are listed below. Findings: The mucosa appeared normal throughout the entire examined colon. Random colon biopsies obtained to rule out microscopic colitis and/or inflammation. There was evidence of diverticular disease throughout the left colon. Retroflexion in the rectum revealed moderately inflamed, non-bleeding hemorrhoids. Recommendations:  1. Repeat colonoscopy: pending pathology - 10 years, for CRC screening  2. Await biopsy results  3. Anusol suppositories twice a day x 14 days. Findings and recommendations were discussed w/ the patient. A copy of the images was provided. Alexandro Montaño am scribing for and in the presence of Dr. Stephanie Allen MD.  Electronically signed by Mica Shannon RN on 9/15/2022 at 7:42 AM    I personally performed the services described in this documentation as scribed by [employer], and it appears accurate and complete.      Stephanie Allen MD  9/15/2022

## 2022-09-15 NOTE — ANESTHESIA POSTPROCEDURE EVALUATION
Department of Anesthesiology  Postprocedure Note    Patient: Lena Lama  MRN: [id number]  [place]: 1960  Date of evaluation: 9/15/2022      Procedure Summary     Date: 09/15/22 Room / Location: [facility] ENDO 01 / [address]    Anesthesia Start: 6263 Anesthesia Stop:     Procedure: COLONOSCOPY DIAGNOSTIC (Abdomen) Diagnosis:       RB (rectal bleeding)      Intermittent diarrhea      Abdominal pain, unspecified abdominal location      (RB (rectal bleeding) [K62.5])      (Intermittent diarrhea [R19.7])      (Abdominal pain, unspecified abdominal location [R10.9])    Surgeons: Leslye Cavanaugh MD Responsible Provider: ELLEN Whaley CRNA    Anesthesia Type: general, TIVA ASA Status: 2          Anesthesia Type: No value filed.     Ken Phase I:      Ken Phase II:        Anesthesia Post Evaluation    Patient location during evaluation: bedside  Patient participation: complete - patient participated  Level of consciousness: sleepy but conscious  Pain score: 0  Airway patency: patent  Nausea & Vomiting: no nausea and no vomiting  Complications: no  Cardiovascular status: blood pressure returned to baseline  Respiratory status: acceptable, room air and spontaneous ventilation  Hydration status: euvolemic

## 2022-09-19 ENCOUNTER — TELEPHONE (OUTPATIENT)
Dept: GASTROENTEROLOGY | Age: 62
End: 2022-09-19

## 2022-09-19 NOTE — TELEPHONE ENCOUNTER
[id number] [facility],[address], [place] [address]   [address], [place]   Phone:  [phone]  Fax:  [phone]       hydrocortisone (ANUSOL-HC) 25 MG suppository  Date: 9/15/2022 Department: [facility] Ordering/Authorizing: Tammy Kulkarni MD     Outpatient Medication Detail     Disp Refills Start End    hydrocortisone (ANUSOL-HC) 25 MG suppository 28 suppository 0 9/15/2022     Sig - Route: Place 1 suppository rectally in the morning and 1 suppository in the evening. - Rectal    Sent to pharmacy as: Hydrocortisone Acetate 25 MG Rectal Suppository (ANUSOL-HC)    E-Prescribing Status: Receipt confirmed by pharmacy (9/15/2022  8:50 AM CDT)          The above Rx was denied by PA to the patient's insurance [employer]. I called [address] in [place]. Spoke to Pharmacist, we changed the Rx to [place] Cream 2.5 %, apply to rectal area Bid Prn, # 1 30 gram tube x 0 refills and this was covered by the insurance for $11.00 co-pay.  Adan snider How Severe Are Your Spot(S)?: mild Have Your Spot(S) Been Treated In The Past?: has not been treated Hpi Title: Evaluation of Skin Lesions

## 2024-02-08 ENCOUNTER — PATIENT MESSAGE (OUTPATIENT)
Dept: FAMILY MEDICINE CLINIC | Age: 64
End: 2024-02-08

## 2024-02-08 DIAGNOSIS — K64.4 EXTERNAL HEMORRHOID, BLEEDING: Primary | ICD-10-CM

## 2024-02-15 ENCOUNTER — TRANSCRIBE ORDERS (OUTPATIENT)
Dept: ADMINISTRATIVE | Facility: HOSPITAL | Age: 64
End: 2024-02-15
Payer: COMMERCIAL

## 2024-02-15 DIAGNOSIS — Z12.31 ENCOUNTER FOR SCREENING MAMMOGRAM FOR MALIGNANT NEOPLASM OF BREAST: Primary | ICD-10-CM

## 2024-02-16 LAB
NCCN CRITERIA FLAG: NORMAL
TYRER CUZICK SCORE: 7.9

## 2024-02-21 ENCOUNTER — HOSPITAL ENCOUNTER (OUTPATIENT)
Dept: MAMMOGRAPHY | Facility: HOSPITAL | Age: 64
Discharge: HOME | End: 2024-02-21
Admitting: PEDIATRICS
Payer: COMMERCIAL

## 2024-02-21 DIAGNOSIS — Z12.31 ENCOUNTER FOR SCREENING MAMMOGRAM FOR MALIGNANT NEOPLASM OF BREAST: ICD-10-CM

## 2024-02-21 PROCEDURE — 77067 SCR MAMMO BI INCL CAD: CPT

## 2024-02-21 PROCEDURE — 77063 BREAST TOMOSYNTHESIS BI: CPT

## 2024-03-05 ENCOUNTER — OFFICE VISIT (OUTPATIENT)
Dept: SURGERY | Age: 64
End: 2024-03-05
Payer: COMMERCIAL

## 2024-03-05 VITALS
WEIGHT: 144.2 LBS | BODY MASS INDEX: 21.86 KG/M2 | HEART RATE: 65 BPM | HEIGHT: 68 IN | TEMPERATURE: 99 F | OXYGEN SATURATION: 95 %

## 2024-03-05 DIAGNOSIS — K64.9 BLEEDING HEMORRHOIDS: Primary | ICD-10-CM

## 2024-03-05 PROCEDURE — 99203 OFFICE O/P NEW LOW 30 MIN: CPT | Performed by: SURGERY

## 2024-03-05 ASSESSMENT — ENCOUNTER SYMPTOMS
COUGH: 0
SHORTNESS OF BREATH: 0
ANAL BLEEDING: 1
RECTAL PAIN: 1
ABDOMINAL DISTENTION: 0
EYE REDNESS: 0
BACK PAIN: 0
EYE PAIN: 0
SORE THROAT: 0

## 2024-03-05 NOTE — PROGRESS NOTES
Eyes:  Negative for pain and redness.   Respiratory:  Negative for cough and shortness of breath.    Cardiovascular:  Positive for palpitations. Negative for chest pain.   Gastrointestinal:  Positive for anal bleeding and rectal pain. Negative for abdominal distention.   Endocrine: Negative for polydipsia and polyphagia.   Genitourinary:  Negative for dysuria and hematuria.   Musculoskeletal:  Negative for arthralgias and back pain.   Skin:  Negative for rash and wound.   Neurological:  Negative for dizziness and headaches.   Psychiatric/Behavioral:  Negative for confusion and dysphoric mood.        Physical Exam  Vitals reviewed. Exam conducted with a chaperone present.   Constitutional:       General: She is not in acute distress.  HENT:      Head: Normocephalic and atraumatic.      Nose: Nose normal.   Eyes:      General: No scleral icterus.     Pupils: Pupils are equal, round, and reactive to light.   Cardiovascular:      Rate and Rhythm: Normal rate and regular rhythm.   Pulmonary:      Effort: Pulmonary effort is normal. No respiratory distress.   Abdominal:      General: There is no distension.      Palpations: Abdomen is soft.      Tenderness: There is no abdominal tenderness.   Genitourinary:     Rectum: External hemorrhoid (mild edenatous external hemorrhoids) and internal hemorrhoid (some prolapsing internal hemorrhoids) present. Abnormal anal tone (some decreased anal tone).      Comments: No mass on ILEANA, only fullness consistent with internal hemorrhoids    Musculoskeletal:         General: No deformity or signs of injury.      Cervical back: Neck supple. No rigidity.   Skin:     General: Skin is warm and dry.   Neurological:      General: No focal deficit present.      Mental Status: She is alert. Mental status is at baseline.   Psychiatric:         Mood and Affect: Mood normal.         Behavior: Behavior normal.         Assessment and plan:  64 year old female with bleeding and prolapsing internal

## 2024-03-11 ASSESSMENT — PATIENT HEALTH QUESTIONNAIRE - PHQ9
SUM OF ALL RESPONSES TO PHQ QUESTIONS 1-9: 0
2. FEELING DOWN, DEPRESSED OR HOPELESS: NOT AT ALL
1. LITTLE INTEREST OR PLEASURE IN DOING THINGS: NOT AT ALL
SUM OF ALL RESPONSES TO PHQ9 QUESTIONS 1 & 2: 0
SUM OF ALL RESPONSES TO PHQ QUESTIONS 1-9: 0
SUM OF ALL RESPONSES TO PHQ QUESTIONS 1-9: 0
SUM OF ALL RESPONSES TO PHQ9 QUESTIONS 1 & 2: 0
1. LITTLE INTEREST OR PLEASURE IN DOING THINGS: 0
2. FEELING DOWN, DEPRESSED OR HOPELESS: 0
SUM OF ALL RESPONSES TO PHQ QUESTIONS 1-9: 0

## 2024-03-14 ENCOUNTER — OFFICE VISIT (OUTPATIENT)
Dept: FAMILY MEDICINE CLINIC | Age: 64
End: 2024-03-14
Payer: COMMERCIAL

## 2024-03-14 VITALS
SYSTOLIC BLOOD PRESSURE: 122 MMHG | WEIGHT: 144.5 LBS | BODY MASS INDEX: 21.9 KG/M2 | HEART RATE: 68 BPM | HEIGHT: 68 IN | DIASTOLIC BLOOD PRESSURE: 72 MMHG | TEMPERATURE: 97.2 F

## 2024-03-14 DIAGNOSIS — Z91.09 ENVIRONMENTAL ALLERGIES: ICD-10-CM

## 2024-03-14 DIAGNOSIS — K64.9 HEMORRHOIDS, UNSPECIFIED HEMORRHOID TYPE: ICD-10-CM

## 2024-03-14 DIAGNOSIS — Z00.00 ENCOUNTER FOR PREVENTIVE HEALTH EXAMINATION: Primary | ICD-10-CM

## 2024-03-14 PROCEDURE — 99396 PREV VISIT EST AGE 40-64: CPT | Performed by: PEDIATRICS

## 2024-03-14 RX ORDER — LEVOCETIRIZINE DIHYDROCHLORIDE 5 MG/1
5 TABLET, FILM COATED ORAL NIGHTLY
Qty: 90 TABLET | Refills: 3 | Status: SHIPPED | OUTPATIENT
Start: 2024-03-14

## 2024-03-14 SDOH — ECONOMIC STABILITY: INCOME INSECURITY: HOW HARD IS IT FOR YOU TO PAY FOR THE VERY BASICS LIKE FOOD, HOUSING, MEDICAL CARE, AND HEATING?: NOT HARD AT ALL

## 2024-03-14 SDOH — ECONOMIC STABILITY: FOOD INSECURITY: WITHIN THE PAST 12 MONTHS, THE FOOD YOU BOUGHT JUST DIDN'T LAST AND YOU DIDN'T HAVE MONEY TO GET MORE.: NEVER TRUE

## 2024-03-14 SDOH — ECONOMIC STABILITY: HOUSING INSECURITY
IN THE LAST 12 MONTHS, WAS THERE A TIME WHEN YOU DID NOT HAVE A STEADY PLACE TO SLEEP OR SLEPT IN A SHELTER (INCLUDING NOW)?: NO

## 2024-03-14 SDOH — ECONOMIC STABILITY: FOOD INSECURITY: WITHIN THE PAST 12 MONTHS, YOU WORRIED THAT YOUR FOOD WOULD RUN OUT BEFORE YOU GOT MONEY TO BUY MORE.: NEVER TRUE

## 2024-03-14 ASSESSMENT — PATIENT HEALTH QUESTIONNAIRE - PHQ9
SUM OF ALL RESPONSES TO PHQ QUESTIONS 1-9: 0
1. LITTLE INTEREST OR PLEASURE IN DOING THINGS: 0
SUM OF ALL RESPONSES TO PHQ9 QUESTIONS 1 & 2: 0
SUM OF ALL RESPONSES TO PHQ QUESTIONS 1-9: 0
2. FEELING DOWN, DEPRESSED OR HOPELESS: 0

## 2024-03-14 ASSESSMENT — ENCOUNTER SYMPTOMS
EYES NEGATIVE: 1
RHINORRHEA: 1
RESPIRATORY NEGATIVE: 1

## 2024-03-14 NOTE — PROGRESS NOTES
abdominal tenderness.   Genitourinary:     Comments: Exam deferred  Musculoskeletal:         General: Normal range of motion.      Cervical back: Normal range of motion and neck supple.      Right lower leg: No edema.      Left lower leg: No edema.   Skin:     General: Skin is warm and dry.   Neurological:      General: No focal deficit present.      Mental Status: She is alert and oriented to person, place, and time.   Psychiatric:         Mood and Affect: Mood normal.         Behavior: Behavior normal.           ASSESSMENT      ICD-10-CM    1. Encounter for preventive health examination  Z00.00 CBC with Auto Differential     Comprehensive Metabolic Panel     Hepatitis C Antibody     HIV Rapid 1&2     Lipid Panel     Microalbumin / Creatinine Urine Ratio     T4, Free     TSH      2. Environmental allergies  Z91.09 levocetirizine (XYZAL) 5 MG tablet      3. Hemorrhoids, unspecified hemorrhoid type  K64.9             PLAN    1. Environmental allergies  Xyzal has been effective.  Continue the same  - levocetirizine (XYZAL) 5 MG tablet; Take 1 tablet by mouth nightly  Dispense: 90 tablet; Refill: 3    2. Encounter for preventive health examination  Routine laboratory evaluation  - CBC with Auto Differential; Future  - Comprehensive Metabolic Panel; Future  - Hepatitis C Antibody; Future  - HIV Rapid 1&2; Future  - Lipid Panel; Future  - Microalbumin / Creatinine Urine Ratio; Future  - T4, Free; Future  - TSH; Future    3. Hemorrhoids, unspecified hemorrhoid type  Conservative interventions already in place      Orders Placed This Encounter   Procedures    CBC with Auto Differential    Comprehensive Metabolic Panel    Hepatitis C Antibody    HIV Rapid 1&2    Lipid Panel    Microalbumin / Creatinine Urine Ratio    T4, Free    TSH        Return in about 1 year (around 3/14/2025) for 30, Physical.     This was an in-house visit

## 2024-03-22 DIAGNOSIS — Z00.00 ENCOUNTER FOR PREVENTIVE HEALTH EXAMINATION: ICD-10-CM

## 2024-03-22 LAB
ALBUMIN SERPL-MCNC: 4.5 G/DL (ref 3.5–5.2)
ALP SERPL-CCNC: 71 U/L (ref 35–104)
ALT SERPL-CCNC: 22 U/L (ref 5–33)
ANION GAP SERPL CALCULATED.3IONS-SCNC: 15 MMOL/L (ref 7–19)
AST SERPL-CCNC: 22 U/L (ref 5–32)
BASOPHILS # BLD: 0 K/UL (ref 0–0.2)
BASOPHILS NFR BLD: 0.6 % (ref 0–1)
BILIRUB SERPL-MCNC: 0.3 MG/DL (ref 0.2–1.2)
BUN SERPL-MCNC: 11 MG/DL (ref 8–23)
CALCIUM SERPL-MCNC: 10.2 MG/DL (ref 8.8–10.2)
CHLORIDE SERPL-SCNC: 106 MMOL/L (ref 98–111)
CHOLEST SERPL-MCNC: 200 MG/DL (ref 160–199)
CO2 SERPL-SCNC: 25 MMOL/L (ref 22–29)
CREAT SERPL-MCNC: 0.8 MG/DL (ref 0.5–0.9)
CREAT UR-MCNC: 203 MG/DL (ref 28–217)
EOSINOPHIL # BLD: 0.2 K/UL (ref 0–0.6)
EOSINOPHIL NFR BLD: 3 % (ref 0–5)
ERYTHROCYTE [DISTWIDTH] IN BLOOD BY AUTOMATED COUNT: 12.6 % (ref 11.5–14.5)
GLUCOSE SERPL-MCNC: 87 MG/DL (ref 74–109)
HCT VFR BLD AUTO: 42.5 % (ref 37–47)
HCV AB SERPL QL IA: NORMAL
HDLC SERPL-MCNC: 75 MG/DL (ref 65–121)
HGB BLD-MCNC: 13.2 G/DL (ref 12–16)
HIV-1 P24 AG: NORMAL
HIV1+2 AB SERPLBLD QL IA.RAPID: NORMAL
IMM GRANULOCYTES # BLD: 0 K/UL
LDLC SERPL CALC-MCNC: 109 MG/DL
LYMPHOCYTES # BLD: 2.4 K/UL (ref 1.1–4.5)
LYMPHOCYTES NFR BLD: 46.7 % (ref 20–40)
MCH RBC QN AUTO: 30.8 PG (ref 27–31)
MCHC RBC AUTO-ENTMCNC: 31.1 G/DL (ref 33–37)
MCV RBC AUTO: 99.1 FL (ref 81–99)
MICROALBUMIN UR-MCNC: 1.6 MG/DL (ref 0–19)
MICROALBUMIN/CREAT UR-RTO: 7.9 MG/G
MONOCYTES # BLD: 0.6 K/UL (ref 0–0.9)
MONOCYTES NFR BLD: 11.1 % (ref 0–10)
NEUTROPHILS # BLD: 1.9 K/UL (ref 1.5–7.5)
NEUTS SEG NFR BLD: 38.4 % (ref 50–65)
PLATELET # BLD AUTO: 296 K/UL (ref 130–400)
PMV BLD AUTO: 9.5 FL (ref 9.4–12.3)
POTASSIUM SERPL-SCNC: 4.3 MMOL/L (ref 3.5–5)
PROT SERPL-MCNC: 7 G/DL (ref 6.6–8.7)
RBC # BLD AUTO: 4.29 M/UL (ref 4.2–5.4)
SODIUM SERPL-SCNC: 146 MMOL/L (ref 136–145)
T4 FREE SERPL-MCNC: 1.31 NG/DL (ref 0.93–1.7)
TRIGL SERPL-MCNC: 81 MG/DL (ref 0–149)
TSH SERPL DL<=0.005 MIU/L-ACNC: 3.32 UIU/ML (ref 0.27–4.2)
WBC # BLD AUTO: 5 K/UL (ref 4.8–10.8)

## 2024-03-25 ENCOUNTER — TELEPHONE (OUTPATIENT)
Dept: FAMILY MEDICINE CLINIC | Age: 64
End: 2024-03-25

## 2024-03-25 NOTE — TELEPHONE ENCOUNTER
Called patient, spoke with: Patient regarding the results of the patients most recent labs.  I advised Patient of Dr. Wilkerson recommendations.   Patient did voice understanding

## 2024-03-25 NOTE — TELEPHONE ENCOUNTER
----- Message from MERCEDEZ Wilkerson DO sent at 3/22/2024  5:19 PM CDT -----  Lipids are mildly elevated but within range and you may be able to get this down with diet and exercise.  Your metabolic profile is normal.  This includes kidney and liver functions as well as electrolytes.  Your WBC, (infection fighting ability) Hgb and Hct, (oxygen carrying cells) are normal; as is your percentage of each cell type.  It does look like you may have had a recent viral infection.  Hep C and HIV are nonreactive, meaning normal  Thyroid is normal.  No significant protein excretion in the urine.

## 2024-10-31 ENCOUNTER — LAB (OUTPATIENT)
Dept: FAMILY MEDICINE CLINIC | Age: 64
End: 2024-10-31
Payer: COMMERCIAL

## 2024-10-31 DIAGNOSIS — Z23 NEED FOR INFLUENZA VACCINATION: Primary | ICD-10-CM

## 2024-10-31 PROCEDURE — 90471 IMMUNIZATION ADMIN: CPT | Performed by: PEDIATRICS

## 2024-10-31 PROCEDURE — 90661 CCIIV3 VAC ABX FR 0.5 ML IM: CPT | Performed by: PEDIATRICS

## 2024-10-31 NOTE — PROGRESS NOTES
Immunizations Administered       Name Date Dose Route    Influenza, FLUCELVAX, (age 6 mo+) IM, Trivalent PF, 0.5mL 10/31/2024 0.5 mL Intramuscular    Site: Deltoid- Right    Lot: [id number]    NDC: 56426-794-27

## 2024-12-27 ENCOUNTER — OFFICE VISIT (OUTPATIENT)
Dept: FAMILY MEDICINE CLINIC | Age: 64
End: 2024-12-27
Payer: COMMERCIAL

## 2024-12-27 VITALS
WEIGHT: 142 LBS | SYSTOLIC BLOOD PRESSURE: 126 MMHG | OXYGEN SATURATION: 96 % | HEIGHT: 68 IN | TEMPERATURE: 98.3 F | HEART RATE: 83 BPM | DIASTOLIC BLOOD PRESSURE: 82 MMHG | BODY MASS INDEX: 21.52 KG/M2

## 2024-12-27 DIAGNOSIS — D64.9 ANEMIA, UNSPECIFIED TYPE: Primary | ICD-10-CM

## 2024-12-27 PROCEDURE — 99213 OFFICE O/P EST LOW 20 MIN: CPT | Performed by: PEDIATRICS

## 2024-12-27 SDOH — ECONOMIC STABILITY: FOOD INSECURITY: WITHIN THE PAST 12 MONTHS, YOU WORRIED THAT YOUR FOOD WOULD RUN OUT BEFORE YOU GOT MONEY TO BUY MORE.: NEVER TRUE

## 2024-12-27 SDOH — ECONOMIC STABILITY: FOOD INSECURITY: WITHIN THE PAST 12 MONTHS, THE FOOD YOU BOUGHT JUST DIDN'T LAST AND YOU DIDN'T HAVE MONEY TO GET MORE.: NEVER TRUE

## 2024-12-27 SDOH — ECONOMIC STABILITY: INCOME INSECURITY: HOW HARD IS IT FOR YOU TO PAY FOR THE VERY BASICS LIKE FOOD, HOUSING, MEDICAL CARE, AND HEATING?: NOT HARD AT ALL

## 2024-12-27 ASSESSMENT — PATIENT HEALTH QUESTIONNAIRE - PHQ9
SUM OF ALL RESPONSES TO PHQ QUESTIONS 1-9: 0
SUM OF ALL RESPONSES TO PHQ QUESTIONS 1-9: 0
1. LITTLE INTEREST OR PLEASURE IN DOING THINGS: NOT AT ALL
2. FEELING DOWN, DEPRESSED OR HOPELESS: NOT AT ALL
SUM OF ALL RESPONSES TO PHQ9 QUESTIONS 1 & 2: 0
SUM OF ALL RESPONSES TO PHQ QUESTIONS 1-9: 0
SUM OF ALL RESPONSES TO PHQ QUESTIONS 1-9: 0

## 2024-12-27 ASSESSMENT — ENCOUNTER SYMPTOMS
RESPIRATORY NEGATIVE: 1
RHINORRHEA: 1
EYES NEGATIVE: 1

## 2024-12-27 NOTE — PROGRESS NOTES
[address] [place] [address]  Phone [phone]   Fax [phone]      OFFICE VISIT: 2024    Kourtney German-: 1960      HPI  Reason For Visit:  Kourtney is a 64 y.o.     Abnormal Lab (States low HGB has been going on for 6th months. Has been trying to give blood since August and has been below the threshold 3 times. States feels weak randomly. A little fatigue and a little dizziness.)    Patient presents with concerns about being rejected at blood donation center.  She was rejected due to having a low hemoglobin.  She was told that her hemoglobin level was 10.    She does have a history of bleeding with bleeding hemorrhoids.  See surgery evaluation from 3/5/2024.  Most recent recorded hemoglobin/hematocrit in the medical record = 13.2/42.5 (3/22/2024)  Indices were normal.  She had a colonoscopy performed on 9/15/2022 that was normal.  Random biopsy of the colon was performed which was also normal colonic tissue.    She is 64 years old without any obvious vaginal bleeding.  No other blood loss source is obviously present.  She has no pre-existing diagnoses of chronic inflammatory disease  Thyroid was normal on most recent blood draw.  Kidney function was normal on most recent blood draw.     height is 1.727 m (5' 8\") and weight is 64.4 kg (142 lb). Her temporal temperature is 98.3 °F (36.8 °C). Her blood pressure is 126/82 and her pulse is 83. Her oxygen saturation is 96%.      Body mass index is 21.59 kg/m².      I have reviewed the following with the Ms. German   Lab Review  No visits with results within 6 Month(s) from this visit.   Latest known visit with results is:   Orders Only on 2024   Component Date Value    TSH 2024 3.320     T4 Free 2024 1.31     Microalb, Ur 2024 1.60     Creatinine, Ur 2024 203.0     Albumin/Creatinine Ratio 2024 7.9     Cholesterol, Total 2024 200 (H)     Triglycerides 2024 81     HDL

## 2025-01-02 ENCOUNTER — TELEPHONE (OUTPATIENT)
Dept: FAMILY MEDICINE CLINIC | Age: 65
End: 2025-01-02

## 2025-01-02 DIAGNOSIS — D64.9 ANEMIA, UNSPECIFIED TYPE: ICD-10-CM

## 2025-01-02 LAB
ALBUMIN SERPL-MCNC: 4.6 G/DL (ref 3.5–5.2)
ALP SERPL-CCNC: 72 U/L (ref 35–104)
ALT SERPL-CCNC: 20 U/L (ref 5–33)
ANION GAP SERPL CALCULATED.3IONS-SCNC: 16 MMOL/L (ref 7–19)
AST SERPL-CCNC: 20 U/L (ref 5–32)
BASOPHILS # BLD: 0 K/UL (ref 0–0.2)
BASOPHILS NFR BLD: 0.7 % (ref 0–1)
BILIRUB SERPL-MCNC: 0.2 MG/DL (ref 0.2–1.2)
BUN SERPL-MCNC: 13 MG/DL (ref 8–23)
CALCIUM SERPL-MCNC: 10 MG/DL (ref 8.8–10.2)
CHLORIDE SERPL-SCNC: 104 MMOL/L (ref 98–111)
CO2 SERPL-SCNC: 23 MMOL/L (ref 22–29)
CREAT SERPL-MCNC: 0.8 MG/DL (ref 0.5–0.9)
EOSINOPHIL # BLD: 0.1 K/UL (ref 0–0.6)
EOSINOPHIL NFR BLD: 1.6 % (ref 0–5)
ERYTHROCYTE [DISTWIDTH] IN BLOOD BY AUTOMATED COUNT: 19.2 % (ref 11.5–14.5)
FERRITIN SERPL-MCNC: 10.6 NG/ML (ref 13–150)
FOLATE SERPL-MCNC: 9.1 NG/ML (ref 4.8–37.3)
GLUCOSE SERPL-MCNC: 87 MG/DL (ref 70–99)
HCT VFR BLD AUTO: 37.5 % (ref 37–47)
HGB BLD-MCNC: 11.4 G/DL (ref 12–16)
IMM GRANULOCYTES # BLD: 0 K/UL
IRON SATN MFR SERPL: 7 % (ref 14–50)
IRON SERPL-MCNC: 32 UG/DL (ref 37–145)
LYMPHOCYTES # BLD: 2.5 K/UL (ref 1.1–4.5)
LYMPHOCYTES NFR BLD: 44.5 % (ref 20–40)
MCH RBC QN AUTO: 25.3 PG (ref 27–31)
MCHC RBC AUTO-ENTMCNC: 30.4 G/DL (ref 33–37)
MCV RBC AUTO: 83.1 FL (ref 81–99)
MONOCYTES # BLD: 0.7 K/UL (ref 0–0.9)
MONOCYTES NFR BLD: 12.8 % (ref 0–10)
NEUTROPHILS # BLD: 2.2 K/UL (ref 1.5–7.5)
NEUTS SEG NFR BLD: 40.2 % (ref 50–65)
PLATELET # BLD AUTO: 282 K/UL (ref 130–400)
PMV BLD AUTO: 10.3 FL (ref 9.4–12.3)
POTASSIUM SERPL-SCNC: 4.3 MMOL/L (ref 3.5–5)
PROT SERPL-MCNC: 7.3 G/DL (ref 6.4–8.3)
RBC # BLD AUTO: 4.51 M/UL (ref 4.2–5.4)
SODIUM SERPL-SCNC: 143 MMOL/L (ref 136–145)
TIBC SERPL-MCNC: 451 UG/DL (ref 250–400)
TSH SERPL DL<=0.005 MIU/L-ACNC: 3.71 UIU/ML (ref 0.27–4.2)
VIT B12 SERPL-MCNC: 503 PG/ML (ref 232–1245)
WBC # BLD AUTO: 5.6 K/UL (ref 4.8–10.8)

## 2025-01-02 NOTE — TELEPHONE ENCOUNTER
----- Message from Dr. MERCEDEZ Wilkerson DO sent at 1/2/2025 12:58 PM CST -----  Iron stores are very low at 10.6.  Additional iron levels are also low.  You are iron deficient.  This implies that you are either not absorbing iron appropriately, or you are losing blood occultly (from an unknown source).  Most common place for blood loss would be in the GI tract.  Recommend fit test to evaluate for blood in stool.  If this is and negative, we will look to see if there is a problem with iron absorption.  This may require GI assistance.    CBC is also consistent with iron deficiency with a hypochromic, microcytic anemia.  Folic acid level is normal.  B12 level is normal.  Your metabolic profile is normal.  This includes kidney and liver functions as well as electrolytes.  Thyroid is normal.

## 2025-01-02 NOTE — TELEPHONE ENCOUNTER
Spoke with patient and she voiced understanding.  She report that she will come by office today to  fit test

## 2025-01-07 ENCOUNTER — TELEPHONE (OUTPATIENT)
Age: 65
End: 2025-01-07

## 2025-01-07 ENCOUNTER — LAB (OUTPATIENT)
Age: 65
End: 2025-01-07
Payer: COMMERCIAL

## 2025-01-07 DIAGNOSIS — Z12.11 COLON CANCER SCREENING: Primary | ICD-10-CM

## 2025-01-07 DIAGNOSIS — D64.9 ANEMIA, UNSPECIFIED TYPE: Primary | ICD-10-CM

## 2025-01-07 LAB
CONTROL: NORMAL
FECAL BLOOD IMMUNOCHEMICAL TEST: NORMAL

## 2025-01-07 PROCEDURE — 82274 ASSAY TEST FOR BLOOD FECAL: CPT

## 2025-01-07 NOTE — TELEPHONE ENCOUNTER
----- Message from ELLEN Avelar sent at 1/7/2025 12:06 PM CST -----  Fit test negative great news recheck in 1 year

## 2025-01-07 NOTE — TELEPHONE ENCOUNTER
Patient did FIT test. FIT test was negative. Do we need to place a referral to GI?    \"  MERCEDEZ Moreno Wilkersno, DO  1/2/2025 12:58 PM CST Back to Top      Iron stores are very low at 10.6.  Additional iron levels are also low.  You are iron deficient.  This implies that you are either not absorbing iron appropriately, or you are losing blood occultly (from an unknown source).  Most common place for blood loss would be in the GI tract.  Recommend fit test to evaluate for blood in stool.  If this is and negative, we will look to see if there is a problem with iron absorption.  This may require GI assistance.     CBC is also consistent with iron deficiency with a hypochromic, microcytic anemia.  Folic acid level is normal.

## 2025-01-08 NOTE — TELEPHONE ENCOUNTER
This could be a malabsorption process which can be caused by various inflammatory processes such as celiac disease, inflammatory bowel disease, bariatric surgery, or other autoimmune absorption issues  We can put in a GI referral please

## 2025-01-08 NOTE — TELEPHONE ENCOUNTER
Pt aware and voiced understanding. Informed patient of any recommendations from providers. Will call with any further questions.     Referral placed as recommended by provider.

## 2025-01-13 ENCOUNTER — OFFICE VISIT (OUTPATIENT)
Dept: GASTROENTEROLOGY | Age: 65
End: 2025-01-13
Payer: COMMERCIAL

## 2025-01-13 VITALS
HEIGHT: 68 IN | WEIGHT: 150 LBS | BODY MASS INDEX: 22.73 KG/M2 | HEART RATE: 74 BPM | SYSTOLIC BLOOD PRESSURE: 120 MMHG | OXYGEN SATURATION: 96 % | DIASTOLIC BLOOD PRESSURE: 80 MMHG

## 2025-01-13 DIAGNOSIS — K64.9 BLEEDING HEMORRHOIDS: ICD-10-CM

## 2025-01-13 DIAGNOSIS — D50.9 IRON DEFICIENCY ANEMIA, UNSPECIFIED IRON DEFICIENCY ANEMIA TYPE: Primary | ICD-10-CM

## 2025-01-13 PROCEDURE — 99214 OFFICE O/P EST MOD 30 MIN: CPT | Performed by: NURSE PRACTITIONER

## 2025-01-13 RX ORDER — HYDROCORTISONE 25 MG/G
CREAM TOPICAL 2 TIMES DAILY
Qty: 1 EACH | Refills: 1 | Status: SHIPPED | OUTPATIENT
Start: 2025-01-13 | End: 2025-01-27

## 2025-01-13 ASSESSMENT — ENCOUNTER SYMPTOMS
COUGH: 0
CONSTIPATION: 0
ABDOMINAL PAIN: 0
SHORTNESS OF BREATH: 0
ABDOMINAL DISTENTION: 0
VOMITING: 0
ANAL BLEEDING: 1
DIARRHEA: 0
NAUSEA: 0
BLOOD IN STOOL: 0
CHOKING: 0
TROUBLE SWALLOWING: 0
RECTAL PAIN: 1

## 2025-01-13 NOTE — PROGRESS NOTES
Subjective:     Patient ID: Kourtney German is a 64 y.o. female  PCP: MERCEDEZ Wilkerson DO  Referring Provider: MERCEDEZ Wilkerson DO    HPI  Patient presents to the office today with the following complaints: Anemia      Pt seen today for anemia.  This is new since August 2024.  Hgb 11.4 on 1/2/2025, Iron 32.  POCT OB (-) x 1.  She has c/o chronic issues with hemorrhoids.  She c/o rectal bleeding weekly or every 10 days.  She c/o rectal pain and burning.  She denies any constipation or diarrhea.  Pt admits to rare issues with heartburn reflux.  She relates this to overeating or ETOH use.  Denies any melena, hematemesis       She has never had an EGD   Last Colonoscopy 9/15/2022 - Diverticular disease, moderately inflamed, non-bleeding hemorrhoids-BCM x 1, 10 yr recall   Family hx colon cancer/colon polyps - Denies    WBC (K/uL)   Date Value   01/02/2025 5.6     Hemoglobin (g/dL)   Date Value   01/02/2025 11.4 (L)     Hematocrit (%)   Date Value   01/02/2025 37.5     Platelets (K/uL)   Date Value   01/02/2025 282       Assessment:     1. Iron deficiency anemia, unspecified iron deficiency anemia type    2. Bleeding hemorrhoids              Plan:   - Anusol rectal cream BID x 14 days, may repeat if needed  - Follow up in 3 weeks   - Consider scopes  - Consider referral for hemorrhoids  - Call with any questions or concerns       Orders  No orders of the defined types were placed in this encounter.    Medications  Orders Placed This Encounter   Medications    hydrocortisone (ANUSOL-HC) 2.5 % CREA rectal cream     Sig: Place rectally 2 times daily for 14 days     Dispense:  1 each     Refill:  1         Patient History:     Past Medical History:   Diagnosis Date    Atrial fibrillation (HCC) Many years ago       Past Surgical History:   Procedure Laterality Date    APPENDECTOMY      CHOLECYSTECTOMY      COLONOSCOPY N/A 09/15/2022    Dr RAE Yanes-Diverticular disease, moderately inflamed, non-bleeding hemorrhoids-BCM

## 2025-02-04 ENCOUNTER — OFFICE VISIT (OUTPATIENT)
Dept: GASTROENTEROLOGY | Age: 65
End: 2025-02-04
Payer: COMMERCIAL

## 2025-02-04 VITALS
SYSTOLIC BLOOD PRESSURE: 120 MMHG | HEART RATE: 81 BPM | WEIGHT: 150 LBS | OXYGEN SATURATION: 90 % | BODY MASS INDEX: 22.73 KG/M2 | HEIGHT: 68 IN | DIASTOLIC BLOOD PRESSURE: 80 MMHG

## 2025-02-04 DIAGNOSIS — K64.9 BLEEDING HEMORRHOIDS: ICD-10-CM

## 2025-02-04 DIAGNOSIS — D50.9 IRON DEFICIENCY ANEMIA, UNSPECIFIED IRON DEFICIENCY ANEMIA TYPE: Primary | ICD-10-CM

## 2025-02-04 PROCEDURE — 99213 OFFICE O/P EST LOW 20 MIN: CPT | Performed by: NURSE PRACTITIONER

## 2025-02-04 ASSESSMENT — ENCOUNTER SYMPTOMS
SHORTNESS OF BREATH: 0
VOMITING: 0
DIARRHEA: 0
ABDOMINAL DISTENTION: 0
COUGH: 0
TROUBLE SWALLOWING: 0
CONSTIPATION: 0
BLOOD IN STOOL: 0
CHOKING: 0
ANAL BLEEDING: 1
ABDOMINAL PAIN: 0
NAUSEA: 0
RECTAL PAIN: 1

## 2025-02-04 NOTE — PROGRESS NOTES
Subjective:     Patient ID: Kourtney German is a 64 y.o. female  PCP: MERCEDEZ Wilkerson DO  Referring Provider: No ref. provider found    HPI  Patient presents to the office today with the following complaints: Follow-up      Pt seen today for follow up on anemia, rectal bleeding.  Anusol prescribed at last OV for hemorrhoids.  Today, pt states she is feeling so much better.  Bleeding, rectal pain, burning have all improved.  She denies any further issues or concerns today.           She has never had an EGD   Last Colonoscopy 9/15/2022 - Diverticular disease, moderately inflamed, non-bleeding hemorrhoids-BCM x 1, 10 yr recall   Family hx colon cancer/colon polyps - Denies    LAST OV 1/13/2025  Pt seen today for anemia.  This is new since August 2024.  Hgb 11.4 on 1/2/2025, Iron 32.  POCT OB (-) x 1.  She has c/o chronic issues with hemorrhoids.  She c/o rectal bleeding weekly or every 10 days.  She c/o rectal pain and burning.  She denies any constipation or diarrhea.  Pt admits to rare issues with heartburn reflux.  She relates this to overeating or ETOH use.  Denies any melena, hematemesis         Assessment:     1. Iron deficiency anemia, unspecified iron deficiency anemia type    2. Bleeding hemorrhoids                Plan:   - Ok for Anusol rectal cream BID x 14 days prn  - Follow up prn or sooner if needed  - Consider scopes or referral for hemorrhoids should symptoms recur  - Call with any questions or concerns       Orders  No orders of the defined types were placed in this encounter.    Medications  No orders of the defined types were placed in this encounter.        Patient History:     Past Medical History:   Diagnosis Date    Atrial fibrillation (HCC) Many years ago       Past Surgical History:   Procedure Laterality Date    APPENDECTOMY      CHOLECYSTECTOMY      COLONOSCOPY N/A 09/15/2022    Dr RAE Yanes-Diverticular disease, moderately inflamed, non-bleeding hemorrhoids-BCM x 1, 10 yr recall

## 2025-03-16 DIAGNOSIS — Z91.09 ENVIRONMENTAL ALLERGIES: ICD-10-CM

## 2025-03-17 RX ORDER — LEVOCETIRIZINE DIHYDROCHLORIDE 5 MG/1
5 TABLET, FILM COATED ORAL NIGHTLY
Qty: 90 TABLET | Refills: 2 | Status: SHIPPED | OUTPATIENT
Start: 2025-03-17

## 2025-03-24 ENCOUNTER — PATIENT MESSAGE (OUTPATIENT)
Age: 65
End: 2025-03-24

## 2025-05-30 ENCOUNTER — PATIENT MESSAGE (OUTPATIENT)
Age: 65
End: 2025-05-30

## 2025-05-30 ENCOUNTER — NURSE ONLY (OUTPATIENT)
Age: 65
End: 2025-05-30

## 2025-05-30 DIAGNOSIS — Z13.9 SCREENING DUE: Primary | ICD-10-CM

## 2025-05-30 SDOH — ECONOMIC STABILITY: FOOD INSECURITY: WITHIN THE PAST 12 MONTHS, THE FOOD YOU BOUGHT JUST DIDN'T LAST AND YOU DIDN'T HAVE MONEY TO GET MORE.: NEVER TRUE

## 2025-05-30 SDOH — ECONOMIC STABILITY: FOOD INSECURITY: WITHIN THE PAST 12 MONTHS, YOU WORRIED THAT YOUR FOOD WOULD RUN OUT BEFORE YOU GOT MONEY TO BUY MORE.: NEVER TRUE

## 2025-05-30 SDOH — ECONOMIC STABILITY: INCOME INSECURITY: IN THE LAST 12 MONTHS, WAS THERE A TIME WHEN YOU WERE NOT ABLE TO PAY THE MORTGAGE OR RENT ON TIME?: NO

## 2025-05-30 SDOH — ECONOMIC STABILITY: TRANSPORTATION INSECURITY
IN THE PAST 12 MONTHS, HAS LACK OF TRANSPORTATION KEPT YOU FROM MEETINGS, WORK, OR FROM GETTING THINGS NEEDED FOR DAILY LIVING?: NO

## (undated) DEVICE — ENDO KIT,LOURDES HOSPITAL: Brand: MEDLINE INDUSTRIES, INC.

## (undated) DEVICE — SINGLE PORT MANIFOLD: Brand: NEPTUNE 2

## (undated) DEVICE — CANNULA NSL AD L7FT DIV O2 CO2 W/ M LUERLOCK TRMPT CONN